# Patient Record
Sex: MALE | Race: WHITE | Employment: PART TIME | URBAN - METROPOLITAN AREA
[De-identification: names, ages, dates, MRNs, and addresses within clinical notes are randomized per-mention and may not be internally consistent; named-entity substitution may affect disease eponyms.]

---

## 2019-07-24 ENCOUNTER — TELEPHONE (OUTPATIENT)
Dept: FAMILY MEDICINE CLINIC | Age: 44
End: 2019-07-24

## 2019-07-24 DIAGNOSIS — Z00.00 ROUTINE GENERAL MEDICAL EXAMINATION AT A HEALTH CARE FACILITY: Primary | ICD-10-CM

## 2019-07-24 NOTE — PROGRESS NOTES
HISTORY OF PRESENT ILLNESS  Edmundo Richard is a 38 y.o. male. Mr. Bentley Moon has not been seen since 2015 [facility]. He continues to report stress associated with his busy work schedule as was also the case back in 2015. He now reports that he feels somewhat fatigued. He is concerned about residual urine in the bladder making it necessary for him to void again shortly after he has done so on some occasions. He denies nocturia, dribbling, or difficulty starting his urine stream.  He does report difficulty achieving erections over the past several weeks. Physical   The history is provided by the patient and medical records. Associated symptoms include headaches (not too much). Pertinent negatives include no chest pain, no abdominal pain and no shortness of breath.      Mr#: [id number]      Past Medical History:   Diagnosis Date    Groin pain     Testicular pain        Past Surgical History:   Procedure Laterality Date    HX CYST REMOVAL      neck       Family History   Problem Relation Age of Onset    Heart Disease Father     Hypertension Father     Lung Disease Father     No Known Problems Mother     Diabetes Father     High Cholesterol Father     No Known Problems Brother     No Known Problems Brother        No Known Allergies    Social History     Tobacco Use   Smoking Status Former Smoker    Packs/day: 0.50    Last attempt to quit: 2013    Years since quittin.8   Smokeless Tobacco Never Used       Social History     Substance and Sexual Activity   Alcohol Use Yes    Alcohol/week: 4.0 standard drinks    Types: 2 Glasses of wine, 2 Shots of liquor per week       Health Maintenance Review:  Tetanus immunization - declines, would like to research the indications and potential adverse effects of the Tdap immunization first  Influenza immunization - N/A        Patient Active Problem List   Diagnosis Code    Testicular pain N50.819         Current Outpatient Medications:    ibuprofen (ADVIL) 200 mg tablet, Take  by mouth., Disp: , Rfl:     acetaminophen (TYLENOL) 325 mg tablet, Take  by mouth every four (4) hours as needed for Pain., Disp: , Rfl:     acetaminophen (TYLENOL EXTRA STRENGTH) 500 mg tablet, Take  by mouth every six (6) hours as needed for Pain., Disp: , Rfl:     naproxen sodium (ALEVE) 220 mg tablet, Take 220 mg by mouth two (2) times daily (with meals). , Disp: , Rfl:         Review of Systems   Constitutional: Positive for malaise/fatigue (x 2 months, business stress). Negative for chills, fever and weight loss. HENT: Positive for hearing loss (sometimes has to ask for statements to be repeated). Eyes: Negative for blurred vision and double vision. Corrective lenses   Respiratory: Negative for cough, shortness of breath and wheezing. Cardiovascular: Negative for chest pain, palpitations and leg swelling. Gastrointestinal: Positive for heartburn (takes omeprazole). Negative for abdominal pain, blood in stool, constipation, diarrhea, melena, nausea and vomiting. Genitourinary: Negative for dysuria and urgency. Dribbling, need to urinate after  Problems achieving erection   Musculoskeletal: Negative for joint pain and myalgias. Skin: Negative for itching and rash. Neurological: Positive for dizziness (episodic, for 3-4 seconds, resolves with deep breath) and headaches (not too much). Negative for tingling, sensory change and focal weakness. Endo/Heme/Allergies: Positive for environmental allergies (seasonal). Psychiatric/Behavioral: Negative for depression. The patient is not nervous/anxious and does not have insomnia.          Stress but not depression     Visit Vitals  /60 (BP 1 Location: Left arm, BP Patient Position: Sitting)   Pulse 83   Temp 97.9 °F (36.6 °C) (Esophageal)   Resp 16   Ht 5' 11\" (1.803 m)   Wt 217 lb (98.4 kg)   SpO2 96%   BMI 30.27 kg/m²       Physical Exam   Constitutional: He is oriented to person, place, and time. He appears well-developed and well-nourished. HENT:   Head: Normocephalic. Right Ear: Tympanic membrane and ear canal normal.   Left Ear: Tympanic membrane and ear canal normal.   Mouth/Throat: Oropharynx is clear and moist.   Eyes: Pupils are equal, round, and reactive to light. Conjunctivae and EOM are normal.   Neck: Neck supple. Cardiovascular: Normal rate, regular rhythm, normal heart sounds and intact distal pulses. Pulmonary/Chest: Effort normal and breath sounds normal.   Abdominal: Soft. Bowel sounds are normal. There is no tenderness. Musculoskeletal: He exhibits no edema. Neurological: He is alert and oriented to person, place, and time. He has normal reflexes. Skin: Skin is warm and dry. Psychiatric: He has a normal mood and affect. His behavior is normal.   Nursing note and vitals reviewed. ASSESSMENT and PLAN    ICD-10-CM ICD-9-CM    1. Routine general medical examination at a health care facility Z00.00 V70.0    2. Malaise and fatigue R53.81 780.79     R53.83     3. Reaction to chronic stress F43.8 309.9    4. Lower urinary tract symptoms R39.9 788.99 PSA SCREENING (SCREENING)   5. Erectile dysfunction, unspecified erectile dysfunction type N52.9 607.84 TESTOSTERONE, FREE & TOTAL   6. Screen for STD (sexually transmitted disease) Z11.3 V74.5 CHLAMYDIA/NEISSERIA AMPLIFICATION      HIV 1/2 AG/AB, 4TH GENERATION,W RFLX CONFIRM      T PALLIDUM AB   7. Obesity (BMI 30.0-34. 9) E66.9 278.00    Return for early morning lab, before 8:30 AM-it is noted that some lab was drawn this morning at the patient's telephone request and he now reports that this was not a fasting specimen  Schedule follow-up appointment for about 10 days from now to review lab results  Avoid dietary starch and sugar and follow a program of regular aerobic exercise to work on weight control.

## 2019-07-24 NOTE — TELEPHONE ENCOUNTER
Orders entered, cannot be responsible for having ordered studies not covered by the patient's insurance since I do not know what his insurance status is

## 2019-07-24 NOTE — TELEPHONE ENCOUNTER
Pt will be coming in tomorrow to establish care. Was booked with Dr. Le Neil, but there was a scheduling error. Pt wanted to do labs but his appointment is @3pm. I told the pt that he could come in earlier for labs before appointment around 2pm. Pt said this isnt a problem, but he knows that the orders are not in. I told him that I would ask Dr. Jono Le if he can put them in ahead of time so pt could do them before appointment. Pt states that he will fast for this as well.

## 2019-07-25 ENCOUNTER — OFFICE VISIT (OUTPATIENT)
Dept: FAMILY MEDICINE CLINIC | Age: 44
End: 2019-07-25

## 2019-07-25 VITALS
WEIGHT: 217 LBS | BODY MASS INDEX: 30.38 KG/M2 | OXYGEN SATURATION: 96 % | TEMPERATURE: 97.9 F | DIASTOLIC BLOOD PRESSURE: 60 MMHG | SYSTOLIC BLOOD PRESSURE: 116 MMHG | RESPIRATION RATE: 16 BRPM | HEART RATE: 83 BPM | HEIGHT: 71 IN

## 2019-07-25 DIAGNOSIS — R39.9 LOWER URINARY TRACT SYMPTOMS: ICD-10-CM

## 2019-07-25 DIAGNOSIS — Z00.00 ROUTINE GENERAL MEDICAL EXAMINATION AT A HEALTH CARE FACILITY: Primary | ICD-10-CM

## 2019-07-25 DIAGNOSIS — Z11.3 SCREEN FOR STD (SEXUALLY TRANSMITTED DISEASE): ICD-10-CM

## 2019-07-25 DIAGNOSIS — R53.83 MALAISE AND FATIGUE: ICD-10-CM

## 2019-07-25 DIAGNOSIS — E66.9 OBESITY (BMI 30.0-34.9): ICD-10-CM

## 2019-07-25 DIAGNOSIS — R53.81 MALAISE AND FATIGUE: ICD-10-CM

## 2019-07-25 DIAGNOSIS — N52.9 ERECTILE DYSFUNCTION, UNSPECIFIED ERECTILE DYSFUNCTION TYPE: ICD-10-CM

## 2019-07-25 DIAGNOSIS — F43.89 REACTION TO CHRONIC STRESS: ICD-10-CM

## 2019-07-25 RX ORDER — OMEPRAZOLE 40 MG/1
40 CAPSULE, DELAYED RELEASE ORAL DAILY
COMMUNITY

## 2019-07-25 NOTE — TELEPHONE ENCOUNTER
Discussed with patient; labs have been ordered but because pt is new and has not been seen yet, we are not responsible is labs ordered at not covered by insurance; patient requested STD testing as well, advised pt to wait until his appt to discuss with new PCP.

## 2019-07-25 NOTE — PATIENT INSTRUCTIONS
Return for early morning lab, before 8:30 AM  Schedule follow-up appointment for about 10 days from now to review lab results  Avoid dietary starch and sugar and follow a program of regular aerobic exercise to work on weight control. ÒíÇÑÉ ÇáÝÍÕ ÇáØÈí ÇáÚÇã¡ ãä Óä 18 ÚÇãðÇ Åáì 50 ÚÇãðÇ: ÅÑÔÇÏÇÊ ÇáÑÚÇíÉ  [ Well Visit, Ages 25 to 48: Care Instructions ]  HOOTJLQ ÇáÑÚÇíÉ ÇáÎÇÕÉ Èß  ÞÏ ÊÓÇÚÏß RCUFMK ÇáÌÓÏíÉ Úáì BKTVJDWV Úáì ÊãÊÚß BHGNTF. Ýãä ÎáÇáåÇ¡ íÝÍÕ ÇáØÈíÈ ÕÍÊß ÈÔßá ÚÇã¡ æÑÈãÇ ÇÞÊÑÍ Úáíß ÓÈáÇð áÊÚÊäí ÈäÝÓß ÌíÏðÇ. æÑÈãÇ íæÕì ÃíÖðÇ ÈÅÌÑÇÁ ÝÍæÕÇÊ ØÈíÉ. Ýí DQKLJW¡ íãßäß ÇáãÓÇÚÏÉ Úáì ÇáæÞÇíÉ ãä ÇáãÑÖ ãä ÎáÇá ÊäÇæá ÇáØÚÇã ÇáÕÍí æããÇÑÓÉ ÇáÊãÇÑíä ÇáÑíÇÖíÉ ÈÇäÊÙÇã æÛíÑåÇ ãä ÇáÎØæÇÊ. ÊõÚÏ ÑÚÇíÉ ÇáãÊÇÈÚÉ ÌÒÁðÇ ÃÓÇÓíðÇ Ýí ÚáÇÌß æÓáÇãÊß. ÝÇÍÑÕ Úáì ÊÑÊíÈ ÌãíÚ ãæÇÚíÏ ÒíÇÑÉ ÇáØÈíÈ HBANQFOGQ ÈåÇ¡ æÇÊÕá ÈØÈíÈß ÅÐÇ ßäÊ ÊÚÇäí ãä ÃíÉ ãÔßáÇÊ. æãä ÇáÌíÏ ÃíÖðÇ Ãä ÊÚÑÝ äÊÇÆÌ UTBEWSJX æßÐáß JHREVAUT ÈÞÇÆãÉ ÇáÃÏæíÉ ÇáÊí SNJFDHTN. ßíÝ íãßäß ÇáÇÚÊäÇÁ ÈäÝÓß Ýí EKVFOV¿   · EBPXNW Åáì æÒä ÕÍí HDZZSOTHK Èå. ÝåÐÇ ÓíÄÏí Åáì ÇäÎÝÇÖ ÊÚÑÖß áãÎÇØÑ ÇáßËíÑ ãä WHIMHXX ãËá ÇáÓãäÉ æãÑÖ ÇáÓßÑí æÃãÑÇÖ ÇáÞáÈ æÇÑÊÝÇÚ ÖÛØ ÇáÏã.  · ãÇÑÓ ÇáÊãÇÑíä ÇáÑíÇÖíÉ áãÏÉ 30 ÏÞíÞÉ Úáì ÇáÃÞá Ýí ãÚÙã ÃíÇã ÇáÃÓÈæÚ. æíõÚÏ ÇáãÔí ÎíÇÑðÇ ÌíÏðÇ. æÞÏ ÊÑÛÈ ÃíÖðÇ Ýí ÇáÞíÇã ÈÃäÔØÉ ÃÎÑì¡ ãËá ÇáÌÑí Ãæ ZCXPUNT Ãæ ÑßæÈ QBGUOLUB Ãæ áÚÈ ÇáÊäÓ Ãæ ÇáÑíÇÖÇÊ ÇáÌãÇÚíÉ. äÇÞÔ Ãí ÊÛííÑÇÊ Ýí ÈÑäÇãÌ ÇáÊãÇÑíä ÇáÑíÇÖíÉ ãÚ ÇáØÈíÈ.  · áÇ ÊÏÎä æáÇ ÊÏÚ ÇáÂÎÑíä íÏÎäæä ãä Íæáß. ÅÐÇ ßäÊ ÈÍÇÌÉ Åáì ÇáãÓÇÚÏÉ ááÅÞáÇÚ Úä ÇáÊÏÎíä¡ ÝÊÍÏË ãÚ ÇáØÈíÈ Íæá ÇáÈÑÇãÌ æÇáÃÏæíÉ ÇáÎÇÕÉ ÈÇáÊæÞÝ Úä ÇáÊÏÎíä. íãßä Ãä íÒíÏ åÐÇ ãä ÝÑÕ ÇáÅÞáÇÚ Úä ÇáÊÏÎíä äåÇÆíðÇ.  · äÇÞÔ ãÚ ÇáØÈíÈ Íæá ãÇ ÅÐÇ ßäÊ ÚÑÖÉ ááÅÕÇÈÉ KNMKOQCQ ÇáÊí ÊäÊÞá Úä ØÑíÞ VUSCFBTX ÇáÌäÓíÉ. VVTYCAKQZ ÇáÒæÌíÉ ÇáÔÑÚíÉ æÓíáÉ ãËÇáíÉ áÊÌäÈ ÇáÅÕÇÈÉ ÈÚÏæì Êáß ÇáÃãÑÇÖ.  · ZFGLFVTU ááãÑÃÉ¡ íÊÚíä Ãä ÊÓÊÎÏã æÓÇÆá ãäÚ ÇáÍãá ÅÐÇ ßÇäÊ áÇ ÊÑíÏ ÅäÌÇÈ ÃØÝÇá Ýí åÐÇ ÇáæÞÊ. ÊÍÏË ãÚ ÇáØÈíÈ Íæá ÇáÎíÇÑÇÊ ÇáãÊÇÍÉ æãÇ ÞÏ íßæä ÇáÃÝÖá ÈÇáäÓÈÉ áß.    · ÇÍÑÕ ÏÇÆãðÇ Úáì ÇÓÊÎÏÇã ßÑíã æÇÞò ãä ÇáÔãÓ Úáì ÇáÌáÏ ÇáãßÔæÝ. ÊÃßÏ ãä Ãä ßÑíã ÇáæÞÇíÉ ãä ÇáÔãÓ íÞí ãä ÇáÃÔÚÉ ÝæÞ ÇáÈäÝÓÌíÉ (ÐÇÊ QDNAHFG ÇáãæÌíÉ ÇáØæíáÉ æÇáÞÕíÑÉ Úáì ÍÏ ÓæÇÁ) æÃä ÚÇãá ÇáæÞÇíÉ ãä ÇáÔãÓ Èå áÇ íÞá Úä 15. ÇÓÊÎÏãå íæãíðÇ ÍÊì Ýí ÍÇáÉ ÇáÛíæã. ÞÏ íæÕí ÈÚÖ ÇáÃØÈÇÁ MRIWMOYB ßÑíã æÞÇíÉ ãä ÇáÔãÓ ÈÏÑÌÉ ÚÇãá æÞÇíÉ ÃßÈÑ ãËá 30.  · Þã ÈÒíÇÑÉ ØÈíÈ ÇáÃÓäÇä ãÑÉ Ãæ ÇËäÊíä ÓäæíðÇ áÅÌÑÇÁ FWFGEGVF æÊäÙíÝ ÇáÃÓäÇä.  · ÇÑÊÏ ÍÒÇã ÇáÃãÇä Ýí ÇáÓíÇÑÉ.  · ÇÍÑÕ Úáì ÚÏã ÊäÇæá BAXGBJBBV. ÅÐ íÊÑÊÈ Úáì ÊäÇæáå ÃÖÑÇÑ ßÈíÑÉ Ýí åÐÇ ÇáÅØÇÑ. ÇÊÈÚ äÕíÍÉ ÇáØÈíÈ ÈÔÃä æÞÊ ÅÌÑÇÁ ÈÚÖ ODOMHPFD. íãßä áåÐå EQYUBOZI ÇáßÔÝ Úä CJCXNPD Ýí æÞÊ ãÈßÑ. áÌãíÚ ÇáÃÔÎÇÕ   · SILEZCXISP. íÌÈ ÇÎÊÈÇÑ äÓÈÉ ÇáßæáÓÊÑæá Ýí ÇáÏã áÏíß ÞÈá Óä ÇáÜ 20. ÓíÎÈÑß ÇáØÈíÈ ÈÚÏÏ ÇáãÑÇÊ ÇááÇÒãÉ ááÞíÇã ÈÐáß ÍÓÈ ÚãÑß Ãæ ÇáÊÇÑíÎ ÇáÚÇÆáí Ãæ ÛíÑåÇ ãä ÇáÃãæÑ ÇáÊí íãßä Ãä ÊÒíÏ ãä ÎØÑ ÇáÅÕÇÈÉ ÈÃãÑÇÖ ÇáÞáÈ.  · ÖÛØ ÇáÏã. íÌÈ ÞíÇÓ ÖÛØ ÇáÏã ÃËäÇÁ ÇáÒíÇÑÉ ÇáãÚÊÇÏÉ ááØÈíÈ. ÓíÎÈÑß ÇáØÈíÈ ÈÚÏÏ ÇáãÑÇÊ ÇááÇÒãÉ áÞíÇÓ ÖÛØ ÇáÏã ÍÓÈ ÚãÑß æäÊÇÆÌ ÖÛØ ÇáÏã VYNPSZQA ÇáÃÎÑì.  · ÇáÈÕÑ. ÊÍÏË ãÚ ÇáØÈíÈ Úä ÚÏÏ ÇáãÑÇÊ ÇáÊí íäÈÛí ÝíåÇ ÅÌÑÇÁ ÇÎÊÈÇÑ áÝÍÕ ÇáãíÇå ÇáÒÑÞÇÁ ÈÇáÚíä.  · ãÑÖ ÇáÓßÑí. ÇÓÃá ÇáØÈíÈ ãÇ ÅÐÇ ßÇä íÌÈ ÅÌÑÇÁ ÝÍæÕÇÊ áãÑÖ ÇáÓßÑí.  · ÓÑØÇä ÇáÞæáæä. íÌÈ ÅÌÑÇÁ ÝÍÕ áÓÑØÇä ÇáÞæáæä Ýí Óä ÇáÎãÓíä. ÞÏ ÊÎÖÚ PPYGG ãä TMBESBHZEN ÇáÚÏíÏÉ. ÅÐÇ ßäÊ ÃÕÛÑ ãä Óä 50¡ ÝÞÏ ÊÍÊÇÌ Åáì ÇÎÊÈÇÑ Ýí Óä ãÈßÑ Úä Ðáß ÅÐÇ ßäÊ ÊÚÇäí ãä Ãí ÚæÇãá ÎØÑ. ÊÔãá ÚæÇãá ÇáÎØÑ ãÇ ÅÐÇ ßäÊ ÊÚÇäí ÈÇáÝÚá ãä ÓáíáÉ ÓÑØÇäíÉ æÊãÊ RKJEJAX ãä FQUMRQD Ãæ ãÇ ÅÐÇ ßÇä ÃÍÏ æÇáÏíß Ãæ ÃÎæß Ãæ ÃÎÊß Ãæ æáÏß ãÕÇÈðÇ ÈÓÑØÇä QDVRAZJ. ÈÇáäÓÈÉ ááäÓÇÁ   · ÝÍÕ æÊÕæíÑ ÇáËÏí. ÊÍÏËí ãÚ ÇáØÈíÈ Íæá ãÊì íÌÈ ÅÌÑÇÁ ÝÍÕ ÇáËÏí ÇáÓÑíÑí æÊÕæíÑ ÇáËÏí. íÎÊáÝ ÇáÎÈÑÇÁ ÇáØÈíæä Íæá ãÇ ÅÐÇ ßÇä æßã ãÑÉ íäÈÛí Ãä ÊÌÑí ÇáäÓÇÁ ÊÍÊ Óä 50 åÐå WQAFSVHF. íãßä Ãä íÓÇÚÏß ÇáØÈíÈ Ýí ÊÍÏíÏ ãÇ åæ ãäÇÓÈ ÈÇáäÓÈÉ Åáíß.  · ÝÍÕ ÚäÞ Dewey Dari VVJVVS. íÌÈ ÈÏÁ ÇÎÊÈÇÑ ÚäÞ ÇáÑÍã Ýí Óä 21 ÚÇãðÇ. ÇÎÊÈÇÑ ÚäÞ ÇáÑÍã åæ ÃÝÖá ØÑíÞÉ áÇßÊÔÇÝ ÓÑØÇä ÚäÞ ÇáÑÍã. æíÚÏ ÇáÇÎÊÈÇÑ Ýí ßËíÑ ãä ÇáÃÍíÇä ÌÒÁðÇ ãä ÝÍÕ ÇáÍæÖ.  ÇÓÃáí Úä ÚÏÏ ÇáãÑÇÊ ÇáÊí íÊÚíä ÝíåÇ ÅÌÑÇÁ åÐÇ ÇáÇÎÊÈÇÑ.  · ÝÍæÕÇÊ ÇáÃãÑÇÖ NRJCFUDV ÌäÓíðÇ. ÇÓÃá ÚãÇ ÅÐÇ ßÇä íÌÈ Úáíß ÅÌÑÇÁ ÝÍæÕÇÊ ááÃãÑÇÖ RFFIPWCV ÌäÓíðÇ. ÞÏ Êßæäíä ãÚÑÖÉ ááÎØÑ ÅÐÇ ãÇÑÓÊö ÇáÌäÓ ãÚ ÃßËÑ ãä ÔÎÕ¡ ÎÇÕÉ Ýí ÍÇáÉ ÚÏã ÇÑÊÏÇÁ æÇÞ ÐßÑí. ÈÇáäÓÈÉ Avenida Nova 64 · ÝÍæÕÇÊ ÇáÃãÑÇÖ TYOZGPGY ÌäÓíðÇ. ÇÓÃá ÚãÇ ÅÐÇ ßÇä íÌÈ Úáíß ÅÌÑÇÁ ÝÍæÕÇÊ ááÃãÑÇÖ UWSNEGWY ÌäÓíðÇ. ÞÏ Êßæä ãÚÑÖðÇ ááÎØÑ ÅÐÇ ãÇÑÓÊ ÇáÌäÓ ãÚ ÃßËÑ ãä ÇãÑÃÉ¡ ÎÇÕÉ Ýí ÍÇáÉ ÚÏã ÇÑÊÏÇÁ æÇÞ ÐßÑí.  · ÝÍÕ ÓÑØÇä ÇáÎÕíÉ. ÇÓÃá ÇáØÈíÈ ÚãÇ ÅÐÇ ßÇä íÌÈ ÝÍÕ ÇáÎÕíÊíä ÈÔßá ãäÊÙã.  · Angie Rochester YKKXVRDPOD. ÊÍÏË ãÚ ÇáØÈíÈ Íæá ãÇ ÅÐÇ ßÇä íäÈÛí ÅÌÑÇÁ ÝÍÕ ÇáÏã (íÓãì ÇÎÊÈÇÑ ãÓÊÖÏ CIYWTTTJRM ÇáäæÚí \"PSA\") ááßÔÝ Úä ÓÑØÇä ZSSWSGHVJA. íÎÊáÝ ÇáÎÈÑÇÁ Íæá ãÇ ÅÐÇ ßÇä æãÊì íäÈÛí Úáì UOZRCV ÅÌÑÇÁ åÐÇ ÇáÝÍÕ. íÞÊÑÍ ÈÚÖ ÇáÎÈÑÇÁ Åáì Ãäå íÊÚíä ÇáÞíÇã ÈÐáß ÅÐÇ ßäÊ ÝæÞ Óä 45 æãä ÃÕá ÅÝÑíÞí ÃãÑíßí Ãæ áÏíß ÃÈ Ãæ ÃÎ ÃÕíÈ ÈÓÑØÇä JZJTGZRAJB ÚäÏãÇ ßÇä ÃÕÛÑ ãä 65. ãÊì íäÈÛí Úáíß JUDEKAJ áØáÈ ÇáãÓÇÚÏÉ¿  ÑÇÞÈ Úä ßËÈ ÇáÊÛííÑÇÊ ÇáÊí ÊÍÏË Ýí ÕÍÊß¡ æÇÍÑÕ Úáì ÇáÇÊÕÇá ÈÇáØÈíÈ ÅÐÇ ßäÊ ÊÚÇäí ãä Ãí ãÔÇßá Ãæ ÃÚÑÇÖ ÊËíÑ ÞáÞß. Ãíä íãßä ãÚÑÝÉ ÇáãÒíÏ¿  ÇäÊÞÇá Åáì http://[email or web address]  ÏÎæá P0 íãßäß ãÚÑÝÉ ÇáãÒíÏ ãä ÎáÇá ãÑÈÚ ÇáÈÍË \"ÒíÇÑÉ ÇáÝÍÕ ÇáØÈí ÇáÚÇã¡ ãä Óä 18 ÚÇãðÇ Åáì 48 ÚÇãðÇ: ÅÑÔÇÏÇÊ ÇáÑÚÇíÉ - [ Well Visit, Ages 25 to 48: Care Instructions ]. \"  © 2364-4726 Healthwise, Incorporated. ÊãÊ ÊåíÆÉ ÅÑÔÇÏÇÊ ÇáÚäÇíÉ ÈãæÌÈ ÊÑÎíÕ ãä ãÎÊÕ ÇáÑÚÇíÉ ÇáÕÍíÉ áÏíß. ÅÐÇ ßÇäÊ áÏíß ÃíÉ GLUHARRYC Úä ÍÇáÉ ØÈíÉ Ãæ Ãí ãä åÐå ZFESYMPYY¡ ÝÊæÌå ÏæãðÇ UQIKARQ Åáì ãÎÊÕ ÇáÑÚÇíÉ ÇáÕÍíÉ. ÊäÝí ãäÙãÉ Healthwise, Incorporated Cloria Mc ÖãÇä Ãæ Felicity Fairy HGGCXCF åÐå UBFXDVTRF. ÅÕÏÇÑ ÇáãÍÊæì: 12.1 ãÍÏøË ZPPVNWWE ãä: 6 ÑÈíÚ ÇáËÇäí 1440      ÈÏÁ ÎØÉ áÅäÞÇÕ ÇáæÒä: ÅÑÔÇÏÇÊ ÇáÑÚÇíÉ  [ Starting a Weight Loss Plan: Care Instructions ]  ÅÑÔÇÏÇÊ ÇáÑÚÇíÉ ÇáÎÇÕÉ Èß  ÅÐÇ ßäÊ ÊÝßÑ Ýí ÅäÞÇÕ ÇáæÒä¡ ÝÞÏ íßæä ãä ÇáÕÚÈ ãÚÑÝÉ äÞØÉ ÇáÈÏÇíÉ. ÓíÓÇÚÏß ØÈíÈß Ýí æÖÚ ÇáÎØÉ ÇáÊí ÊáÈí ÇÍÊíÇÌÇÊß Úáì Ãßãá æÌå. ÞÏ ÊÑÛÈ Ýí ÍÖæÑ ÏæÑÉ Úä ÇáÊÛÐíÉ Ãæ ÇáÊãÑíäÇÊ¡ Ãæ FTIHIVVM ÈãÌãæÚÉ ÏÚã áÅäÞÇÕ ÇáæÒä.  ÅÐÇ ßÇäÊ áÏíß ÃÓÆáÉ Íæá ßíÝíÉ Úãá ÊÛííÑÇÊ Ýí ÚÇÏÇÊ ÇáØÚÇã Ãæ ÇáÊãÇÑíä ÇáÑíÇÖíÉ ÇáÎÇÕÉ Èß¡ ÝÇØáÈ ãä ØÈíÈß ÒíÇÑÉ ãÊÎÕÕ ãÓÌá Ýí ÇáÊÛÐíÉ Ãæ ÇáÊãÇÑíä. íãßä Ãä íßæä ÅäÞÇÕ ÇáæÒä ÊÍÏíðÇ ßÈíÑðÇ. áßä áÇ íÊÚíä Úáíß Úãá ÊÛííÑÇÊ ÖÎãÉ ÏÝÚÉ æÇÍÏÉ. ÃÏÎá ÊÛííÑÇÊ ÈÓíØÉ æÇáÊÒã ÈåÇ. ÚäÏãÇ ÊÕÈÍ åÐå ÇáÊÛííÑÇÊ ÚÇÏÉ¡ ÃÖÝ ÇáÞáíá ãä ÇáÊÛííÑÇÊ ÇáÃÎÑì. ÅÐÇ ßäÊ áÇ ÊÚÊÞÏ Ãäß ãÓÊÚÏðÇ áÚãá ÊÛííÑÇÊ Ýí ÇáæÞÊ ÇáÍÇáí¡ ÝÍÇæá ÊÍÏíÏ ãæÚÏ Ýí XFPQTZYV. Þã ÈÊÍÏíÏ ãæÚÏ áÒíÇÑÉ ØÈíÈß áãäÇÞÔÉ ãÇ ÅÐÇ ßÇä ÇáæÞÊ ãäÇÓÈðÇ áÊÈÏÁ ÎØÉ Ãã áÇ. ÊõÚÏ ÑÚÇíÉ ÇáãÊÇÈÚÉ ÌÒÁðÇ ÃÓÇÓíðÇ Ýí ÚáÇÌß æÓáÇãÊß. ÝÚáíß ÇáÍÑÕ Úáì ÊÑÊíÈ ÌãíÚ ãæÇÚíÏ ÒíÇÑÉ ÇáØÈíÈ UOKOOUVFT ÈåÇ¡ FDZDRFNV ÈØÈíÈß ÚäÏ CRHWDJYR ãä Ãí ãÔßáÇÊ. æãä ÇáÌíÏ ÃíÖðÇ Ãä ÊÚÑÝ äÊÇÆÌ BLENDBOE æßÐáß FMGRVBAP ÈÞÇÆãÉ ÇáÃÏæíÉ ÇáÊí EEHXBQIU. ßíÝ íãßäß ÇáÇÚÊäÇÁ ÈäÝÓß Ýí HDCVIU¿   · ÖÚ ÃåÏÇÝðÇ æÇÞÚíÉ. ÇáßËíÑ ãä ÇáÃÔÎÇÕ íÊæÞÚæä ÝÞÏ æÒä ÃßËÑ ÈßËíÑ ãä ÇáãÑÌÍ. ÞÏ íßæä ÅäÞÇÕ 5% Åáì 10% ãä æÒäß ßÇÝíðÇ áÊÍÓíä ÕÍÊß.  · Þã ÈÅÔÑÇß ÇáÃÓÑÉ æÇáÃÕÏÞÇÁ áÊÞÏíã ÇáÏÚã. ÊÍÏË Åáíåã Úä ÓÈÈ ãÍÇæáÊß áÅäÞÇÕ æÒäß¡ æÇØáÈ ãäåã ÇáãÓÇÚÏÉ. íãßäåã ÇáãÓÇÚÏÉ Úä ØÑíÞ ÇáãÔÇÑßÉ Ýí ÇáÊãÇÑíä ÇáÑíÇÖíÉ æÊäÇæá ÇáæÌÈÇÊ ãÚß¡ ÍÊì ÅÐÇ ßÇäæÇ íÃßáæä ÃÔíÇÁ ãÎÊáÝÉ.  · ÊÚÑÝ Úáì ÃßËÑ ãÇ íäÇÓÈß. ÅÐÇ áß íßä áÏíß æÞÊ Ãæ áÇ ÊÍÈ ÇáØåí¡ ÝÞÏ íßæä SEIYSOHO ÇáÐí íÞÏã ÇáÔÑÇÆÍ DKKBJODPYC ÇáÈÏíáÉ ááæÌÈÇÊ åæ ÇáÃÝÖá áß. Çæ ÅÐÇ ßäÊ ÊÍÈ ÊÍÖíÑ ÇáØÚÇã¡ RADJPON Úáì ÎØÉ ÊÊÖãä ÞæÇÆã ææÕÝÇÊ íæãíÉ ÞÏ Êßæä HJMLWM LYNIVPA áß.  · ÇÓÃá ØÈíÈß Úä ãÎÊÕíä ÕÍííä ÂÎÑíä íãßäåã ãÓÇÚÏÊß Ýí ÊÍÞíÞ ÃåÏÇÝß áÅäÞÇÕ ÇáæÒä. o o íãßä Ãä íÓÇÚÏß ÃÎÕÇÆí ÇáÊÛÐíÉ Ýí Úãá ÊÛííÑÇÊ ÕÍíÉ Ýí äÙÇãß ÇáÛÐÇÆí. o o íãßä Ãä íÓÇÚÏß ÃÎÕÇÆí ÇáÊãÇÑíä ÇáÑíÇÖíÉ Ãæ ÇáãÏÑÈ ÇáÔÎÕí Ýí ÊØæíÑ ÈÑäÇãÌ ÊãÇÑíä Âãä æÝÚÇá. o o íãßä Ãä íÓÇÚÏß ÇáãÑÔÏ Çæ ÇáØÈíÈ ÇáäÝÓí Ýí ÇáÊÕÏí Åáì ãÔßáÇÊ ãËá KJSDPFVB¡ Ãæ TNJUNHS¡ Ãæ UYFLLPIL ÇáÃÓÑíÉ ÇáÊí íãßä Ãä ÊÕÚÈ ãä ÊÑßíÒß Úáì ÅäÞÇÕ ÇáæÒä.  · ÝßÑ Ýí IJBLRKDI ÈãÌãæÚÉ ÏÚã ááÃÔÎÇÕ ÇáÐíä íÍÇæáæä ÅäÞÇÕ æÒäåã. íãßä Ãä íÞÊÑÍ ØÈíÈß ãÌãæÚÇÊ Ýí ãäØÞÊß.   Ãíä íãßä ãÚÑÝÉ ÇáãÒíÏ¿  ÇäÊÞÇá Åáì [email or web address]  ÏÎæá U357 íãßäß ãÚÑÝÉ ÇáãÒíÏ ãä ÎáÇá ãÑÈÚ ÇáÈÍË \"ÈÏÁ ÎØÉ áÅäÞÇÕ ÇáæÒä: ÅÑÔÇÏÇÊ ÇáÑÚÇíÉ - [ Starting a Weight Loss Plan: Care Instructions ]. \"  © [id number] Healthwise, StickyADS.tv. ÊãÊ ÊåíÆÉ ÅÑÔÇÏÇÊ ÇáÚäÇíÉ ÈãæÌÈ ÊÑÎíÕ ãä ãÎÊÕ ÇáÑÚÇíÉ ÇáÕÍíÉ áÏíß. ÅÐÇ ßÇäÊ áÏíß ÃíÉ [id number] Úä ÍÇáÉ ØÈíÉ Ãæ Ãí ãä åÐå VMQOLNYSA¡ ÝÊæÌå ÏæãðÇ WLNVFAM Åáì ãÎÊÕ ÇáÑÚÇíÉ ÇáÕÍíÉ. ÊäÝí ãäÙãÉ [employer], [email or web address] Mimi Danika ÖãÇä Ãæ [place] NBZAKRY åÐå [id number]. ÅÕÏÇÑ ÇáãÍÊæì: 12.1 ãÍÏøË ÇÚÊÈÇÑ ãä: 21 ÑÌÈ 1440       Learning About Low-Carbohydrate Diets for Weight Loss  What is a low-carbohydrate diet? Low-carb diets avoid foods that are high in carbohydrate. These high-carb foods include pasta, bread, rice, cereal, fruits, and starchy vegetables. Instead, these diets usually have you eat foods that are high in fat and protein. Many people lose weight quickly on a low-carb diet. But the early weight loss is water. People on this diet often gain the weight back after they start eating carbs again. Not all diet plans are safe or work well. A lot of the evidence shows that low-carb diets aren't healthy. That's because these diets often don't include healthy foods like fruits and vegetables. Losing weight safely means balancing protein, fat, and carbs with every meal and snack. And low-carb diets don't always provide the vitamins, minerals, and fiber you need. If you have a serious medical condition, talk to your doctor before you try any diet. These conditions include kidney disease, heart disease, type 2 diabetes, high cholesterol, and high blood pressure. If you are pregnant, it may not be safe for your baby if you are on a low-carb diet. How can you lose weight safely? You might have heard that a diet plan helped another person lose weight. But that doesn't mean that it will work for you. It is very hard to stay on a diet that includes lots of big changes in your eating habits.  If you want to get to a healthy weight and stay there, making healthy lifestyle changes will often work better than dieting. These steps can help. · Make a plan for change. Work with your doctor to create a plan that is right for you. · See a dietitian. He or she can show you how to make healthy changes in your eating habits. · Manage stress. If you have a lot of stress in your life, it can be hard to focus on making healthy changes to your daily habits. · Track your food and activity. You are likely to do better at losing weight if you keep track of what you eat and what you do. Follow-up care is a key part of your treatment and safety. Be sure to make and go to all appointments, and call your doctor if you are having problems. It's also a good idea to know your test results and keep a list of the medicines you take. Where can you learn more? Go to http://deirdre-linda.info/. Enter A121 in the search box to learn more about \"Learning About Low-Carbohydrate Diets for Weight Loss. \"  Current as of: 2018  Content Version: 12.1  © 7327-2966 [employer]. Care instructions adapted under license by [employer] (which disclaims liability or warranty for this information). If you have questions about a medical condition or this instruction, always ask your healthcare professional. [address] any warranty or liability for your use of this information. Tdap (Tetanus, Diphtheria, Pertussis) Vaccine: What You Need to Know  Why get vaccinated? Tetanus, diphtheria, and pertussis are very serious diseases. Tdap vaccine can protect us from these diseases. And Tdap vaccine given to pregnant women can protect  babies against pertussis. Tetanus (lockjaw) is rare in the [facility] today. It causes painful muscle tightening and stiffness, usually all over the body.   · It can lead to tightening of muscles in the head and neck so you can't open your mouth, swallow, or sometimes even breathe. Tetanus kills about 1 out of 10 people who are infected even after receiving the best medical care. Diphtheria is also rare in the [place] today. It can cause a thick coating to form in the back of the throat. · It can lead to breathing problems, heart failure, paralysis, and death. Pertussis (whooping cough) causes severe coughing spells, which can cause difficulty breathing, vomiting, and disturbed sleep. · It can also lead to weight loss, incontinence, and rib fractures. Up to 2 in 100 adolescents and 5 in 100 adults with pertussis are hospitalized or have complications, which could include pneumonia or death. These diseases are caused by bacteria. Diphtheria and pertussis are spread from person to person through secretions from coughing or sneezing. Tetanus enters the body through cuts, scratches, or wounds. Before vaccines, as many as 200,000 cases of diphtheria, 200,000 cases of pertussis, and hundreds of cases of tetanus were reported in the [place] each year. Since vaccination began, reports of cases for tetanus and diphtheria have dropped by about 99% and for pertussis by about 80%. Tdap vaccine  The Tdap vaccine can protect adolescents and adults from tetanus, diphtheria, and pertussis. One dose of Tdap is routinely given at age 6 or 15. People who did not get Tdap at that age should get it as soon as possible. Tdap is especially important for health care professionals and anyone having close contact with a baby younger than 12 months. Pregnant women should get a dose of Tdap during every pregnancy, to protect the  from pertussis. Infants are most at risk for severe, life-threatening complications from pertussis. Another vaccine, called Td, protects against tetanus and diphtheria, but not pertussis. A Td booster should be given every 10 years. Tdap may be given as one of these boosters if you have never gotten Tdap before.  Tdap may also be given after a severe cut or burn to prevent tetanus infection. Your doctor or the person giving you the vaccine can give you more information. Tdap may safely be given at the same time as other vaccines. Some people should not get this vaccine  · A person who has ever had a life-threatening allergic reaction after a previous dose of any diphtheria-, tetanus-, or pertussis-containing vaccine, OR has a severe allergy to any part of this vaccine, should not get Tdap vaccine. Tell the person giving the vaccine about any severe allergies. · Anyone who had coma or long repeated seizures within 7 days after a childhood dose of DTP or DTaP, or a previous dose of Tdap, should not get Tdap, unless a cause other than the vaccine was found. They can still get Td. · Talk to your doctor if you:  ? Have seizures or another nervous system problem. ? Had severe pain or swelling after any vaccine containing diphtheria, tetanus, or pertussis. ? Ever had a condition called Guillain-Barré Syndrome (GBS). ? Aren't feeling well on the day the shot is scheduled. Risks  With any medicine, including vaccines, there is a chance of side effects. These are usually mild and go away on their own. Serious reactions are also possible but are rare. Most people who get Tdap vaccine do not have any problems with it.   Mild problems following Tdap  (Did not interfere with activities)  · Pain where the shot was given (about 3 in 4 adolescents or 2 in 3 adults)  · Redness or swelling where the shot was given (about 1 person in 5)  · Mild fever of at least 100.4°F (up to about 1 in 25 adolescents or 1 in 100 adults)  · Headache (about 3 or 4 people in 10)  · Tiredness (about 1 person in 3 or 4)  · Nausea, vomiting, diarrhea, stomachache (up to 1 in 4 adolescents or 1 in 10 adults)  · Chills, sore joints (about 1 person in 10)  · Body aches (about 1 person in 3 or 4)  · Rash, swollen glands (uncommon)  Moderate problems following Tdap  (Interfered with activities, but did not require medical attention)  · Pain where the shot was given (up to 1 in 5 or 6)  · Redness or swelling where the shot was given (up to about 1 in 16 adolescents or 1 in 12 adults)  · Fever over 102°F (about 1 in 100 adolescents or 1 in 250 adults)  · Headache (about 1 in 7 adolescents or 1 in 10 adults)  · Nausea, vomiting, diarrhea, stomachache (up to 1 to 3 people in 100)  · Swelling of the entire arm where the shot was given (up to about 1 in 500)  Severe problems following Tdap  (Unable to perform usual activities; required medical attention)  · Swelling, severe pain, bleeding and redness in the arm where the shot was given (rare)  Problems that could happen after any vaccine:  · People sometimes faint after a medical procedure, including vaccination. Sitting or lying down for about 15 minutes can help prevent fainting, and injuries caused by a fall. Tell your doctor if you feel dizzy or have vision changes or ringing in the ears. · Some people get severe pain in the shoulder and have difficulty moving the arm where a shot was given. This happens very rarely. · Any medication can cause a severe allergic reaction. Such reactions from a vaccine are very rare, estimated at fewer than 1 in a million doses, and would happen within a few minutes to a few hours after the vaccination. As with any medicine, there is a very remote chance of a vaccine causing a serious injury or death. The safety of vaccines is always being monitored. For more information, visit: www.cdc.gov/vaccinesafety. What if there is a serious problem? What should I look for? · Look for anything that concerns you, such as signs of a severe allergic reaction, very high fever, or unusual behavior. Signs of a severe allergic reaction can include hives, swelling of the face and throat, difficulty breathing, a fast heartbeat, dizziness, and weakness.  These would usually start a few minutes to a few hours after the vaccination. What should I do? · If you think it is a severe allergic reaction or other emergency that can't wait, call 9-1-1 or get the person to the nearest hospital. Otherwise, call your doctor. · Afterward, the reaction should be reported to the Vaccine Adverse Event Reporting System (VAERS). Your doctor might file this report, or you can do it yourself through the VAERS web site at www.vaers. [facility].gov, or by calling 2-332.790.7638. VAERS does not give medical advice. The National Vaccine Injury Compensation Program  The National Vaccine Injury Compensation Program (VICP) is a federal program that was created to compensate people who may have been injured by certain vaccines. Persons who believe they may have been injured by a vaccine can learn about the program and about filing a claim by calling 4-383.801.5569 or visiting the [employer] website at www.[facility].gov/vaccinecompensation. There is a time limit to file a claim for compensation. How can I learn more? · Ask your doctor. He or she can give you the vaccine package insert or suggest other sources of information. · Call your local or state health department. · Contact the Centers for Disease Control and Prevention (CDC):  ? Call 0-234.201.2051 (5-811-VPR-INFO) or  ? Visit CDC's website at www.cdc.gov/vaccines  Vaccine Information Statement (Interim)  Tdap Vaccine  (2/24/15)  [address]ina [id number]  Department of Health and Human Services  Centers for Disease Control and Prevention  Many Vaccine Information Statements are available in Mongolian and other languages. See www.immunize.org/vis. Muchas hojas de información sobre vacunas están disponibles en español y en otros idiomas. Visite www.immunize.org/vis. Care instructions adapted under license by [employer] (which disclaims liability or warranty for this information).  If you have questions about a medical condition or this instruction, always ask your healthcare professional. [employer], [facility] disclaims any warranty or liability for your use of this information. Erectile Dysfunction: Care Instructions  Your Care Instructions    A man has erectile dysfunction (ED) when he routinely can't get or keep an erection that allows satisfactory sex. He may not be able to have an erection at any time. Or he may not be able to have one that is firm enough or lasts long enough to complete intercourse. ED is not the same as having trouble getting an erection now and then. That's common. It happens to most men at some time. ED can be caused by problems with the blood vessels, nerves, or hormones. It can be caused by diabetes, heart disease, and injuries. Nerve disorders, such as multiple sclerosis or Parkinson's disease, can also cause it. ED can also be caused by medicines, alcohol, and tobacco. Or it may be caused by depression, stress, grief, or relationship problems. Follow-up care is a key part of your treatment and safety. Be sure to make and go to all appointments, and call your doctor if you are having problems. It's also a good idea to know your test results and keep a list of the medicines you take. How can you care for yourself at home?   Lifestyle    · Limit alcohol. Have no more than 2 drinks a day.     · Do not smoke. Smoking makes it harder for the blood vessels in the penis to relax and let blood flow in. If you need help quitting, talk to your doctor about stop-smoking programs and medicines. These can increase your chances of quitting for good.     · Do not use cocaine, heroin, or other illegal drugs.     · Try to reduce stress.     · Give yourself time to adjust to change. Changes in your job, family, relationships, home life, and other areas can cause stress. And stress can cause erection problems.    Work with your partner    · Don't assume that you know what your partner likes when it comes to sex. You may be wrong.  Talk about what each of you does and does not enjoy.     · Make time outside of the bedroom to talk about your sex life. If you avoid sex because you are afraid of having erection problems, your partner may worry that you are no longer interested.     · If you and your partner have trouble talking about sex, see a therapist who can help you talk about it. Reading books with your partner about sexual health may also help.     · Relax. Take time for more foreplay. Worrying about your erections may only make things worse. Medicines    · Tell your doctor about all the medicines that you take. ? Some medicines can cause erection problems. ? Some medicines can have dangerous interactions with medicines that are prescribed for ED, including over-the-counter medicines and herbal products.     · Be safe with medicines. Take your medicines exactly as prescribed. Call your doctor if you think you are having a problem with your medicine.     · Talk to your doctor about trying a medicine to help you keep an erection. This could be a medicine such as Viagra, Levitra, or Cialis. If you have a heart problem, ask your doctor if these are safe for you. Do not take these medicines if you take nitroglycerin or other nitrate medicine. When should you call for help? Call your doctor now or seek immediate medical care if:    · You took a medicine for erectile dysfunction and you have an erection that lasts longer than 3 hours.    Watch closely for changes in your health, and be sure to contact your doctor if you have any problems. Where can you learn more? Go to http://deirdre-linda.info/. Enter [id number] in the search box to learn more about \"Erectile Dysfunction: Care Instructions. \"  Current as of: September 26, 2018  Content Version: 12.1  © 8650-4993 [employer]. Care instructions adapted under license by [employer] (which disclaims liability or warranty for this information).  If you have questions about a medical condition or this instruction, always ask your healthcare professional. [place] [address] any warranty or liability for your use of this information.

## 2019-07-25 NOTE — PROGRESS NOTES
Jen Naik is a 38 y.o. male (: 1975) presenting to address:    Chief Complaint   Patient presents with   [facility] Establish Care    Headache     feeling lethargic, no energy       Vitals:    19 1441   BP: 116/60   Pulse: 83   Resp: 16   Temp: 97.9 °F (36.6 °C)   TempSrc: Esophageal   SpO2: 96%   Weight: 217 lb (98.4 kg)   Height: 5' 11\" (1.803 m)   PainSc:   0 - No pain       Hearing/Vision:   No exam data present    Learning Assessment:     Learning Assessment 2015   PRIMARY LEARNER Patient   HIGHEST LEVEL OF EDUCATION - PRIMARY LEARNER  4 YEARS OF COLLEGE   BARRIERS PRIMARY LEARNER NONE   CO-LEARNER CAREGIVER No   PRIMARY LANGUAGE OTHER (COMMENT)    NEED No   LEARNER PREFERENCE PRIMARY DEMONSTRATION   ANSWERED BY Patient   RELATIONSHIP SELF     Depression Screening:     3 most recent PHQ Screens 2019   Little interest or pleasure in doing things Not at all   Feeling down, depressed, irritable, or hopeless Not at all   Total Score PHQ 2 0     Fall Risk Assessment:   No flowsheet data found. Abuse Screening:   No flowsheet data found. Coordination of Care Questionaire:   1. Have you been to the ER, urgent care clinic since your last visit? Hospitalized since your last visit? NO    2. Have you seen or consulted any other health care providers outside of the [address] [place] [address] since your last visit? Include any pap smears or colon screening. NO    Advanced Directive:   1. Do you have an Advanced Directive? NO    2. Would you like information on Advanced Directives?  NO

## 2019-07-26 LAB
ALBUMIN SERPL-MCNC: 4.7 G/DL (ref 3.5–5.5)
ALBUMIN/GLOB SERPL: 1.8 {RATIO} (ref 1.2–2.2)
ALP SERPL-CCNC: 62 IU/L (ref 39–117)
ALT SERPL-CCNC: 27 IU/L (ref 0–44)
APPEARANCE UR: CLEAR
AST SERPL-CCNC: 25 IU/L (ref 0–40)
BACTERIA #/AREA URNS HPF: NORMAL /[HPF]
BASOPHILS # BLD AUTO: 0 X10E3/UL (ref 0–0.2)
BASOPHILS NFR BLD AUTO: 1 %
BILIRUB SERPL-MCNC: 0.3 MG/DL (ref 0–1.2)
BILIRUB UR QL STRIP: NEGATIVE
BUN SERPL-MCNC: 13 MG/DL (ref 6–24)
BUN/CREAT SERPL: 14 (ref 9–20)
CALCIUM SERPL-MCNC: 10.2 MG/DL (ref 8.7–10.2)
CASTS URNS QL MICRO: NORMAL /LPF
CHLORIDE SERPL-SCNC: 102 MMOL/L (ref 96–106)
CHOLEST SERPL-MCNC: 212 MG/DL (ref 100–199)
CO2 SERPL-SCNC: 22 MMOL/L (ref 20–29)
COLOR UR: YELLOW
CREAT SERPL-MCNC: 0.92 MG/DL (ref 0.76–1.27)
EOSINOPHIL # BLD AUTO: 0.2 X10E3/UL (ref 0–0.4)
EOSINOPHIL NFR BLD AUTO: 3 %
EPI CELLS #/AREA URNS HPF: NORMAL /HPF (ref 0–10)
ERYTHROCYTE [DISTWIDTH] IN BLOOD BY AUTOMATED COUNT: 12.9 % (ref 12.3–15.4)
EST. AVERAGE GLUCOSE BLD GHB EST-MCNC: 117 MG/DL
GLOBULIN SER CALC-MCNC: 2.6 G/DL (ref 1.5–4.5)
GLUCOSE SERPL-MCNC: 96 MG/DL (ref 65–99)
GLUCOSE UR QL: NEGATIVE
HBA1C MFR BLD: 5.7 % (ref 4.8–5.6)
HCT VFR BLD AUTO: 43 % (ref 37.5–51)
HDLC SERPL-MCNC: 41 MG/DL
HGB BLD-MCNC: 14.6 G/DL (ref 13–17.7)
HGB UR QL STRIP: NEGATIVE
IMM GRANULOCYTES # BLD AUTO: 0 X10E3/UL (ref 0–0.1)
IMM GRANULOCYTES NFR BLD AUTO: 0 %
INTERPRETATION, 910389: NORMAL
KETONES UR QL STRIP: NEGATIVE
LDLC SERPL CALC-MCNC: 118 MG/DL (ref 0–99)
LEUKOCYTE ESTERASE UR QL STRIP: NEGATIVE
LYMPHOCYTES # BLD AUTO: 2.4 X10E3/UL (ref 0.7–3.1)
LYMPHOCYTES NFR BLD AUTO: 32 %
MCH RBC QN AUTO: 30.2 PG (ref 26.6–33)
MCHC RBC AUTO-ENTMCNC: 34 G/DL (ref 31.5–35.7)
MCV RBC AUTO: 89 FL (ref 79–97)
MICRO URNS: ABNORMAL
MONOCYTES # BLD AUTO: 0.6 X10E3/UL (ref 0.1–0.9)
MONOCYTES NFR BLD AUTO: 8 %
MUCOUS THREADS URNS QL MICRO: PRESENT
NEUTROPHILS # BLD AUTO: 4.2 X10E3/UL (ref 1.4–7)
NEUTROPHILS NFR BLD AUTO: 56 %
NITRITE UR QL STRIP: NEGATIVE
PH UR STRIP: =>9 [PH] (ref 5–7.5)
PLATELET # BLD AUTO: 260 X10E3/UL (ref 150–450)
POTASSIUM SERPL-SCNC: 4.6 MMOL/L (ref 3.5–5.2)
PROT SERPL-MCNC: 7.3 G/DL (ref 6–8.5)
PROT UR QL STRIP: ABNORMAL
RBC # BLD AUTO: 4.84 X10E6/UL (ref 4.14–5.8)
RBC #/AREA URNS HPF: NORMAL /HPF (ref 0–2)
SODIUM SERPL-SCNC: 141 MMOL/L (ref 134–144)
SP GR UR: 1.02 (ref 1–1.03)
TRIGL SERPL-MCNC: 266 MG/DL (ref 0–149)
TSH SERPL DL<=0.005 MIU/L-ACNC: 1.69 UIU/ML (ref 0.45–4.5)
UROBILINOGEN UR STRIP-MCNC: 0.2 MG/DL (ref 0.2–1)
VLDLC SERPL CALC-MCNC: 53 MG/DL (ref 5–40)
WBC # BLD AUTO: 7.3 X10E3/UL (ref 3.4–10.8)
WBC #/AREA URNS HPF: NORMAL /HPF (ref 0–5)

## 2019-07-30 LAB
C TRACH RRNA SPEC QL NAA+PROBE: NEGATIVE
HIV 1+2 AB+HIV1 P24 AG SERPL QL IA: NON REACTIVE
N GONORRHOEA RRNA SPEC QL NAA+PROBE: NEGATIVE
PSA SERPL-MCNC: 1.1 NG/ML (ref 0–4)
T PALLIDUM AB SER QL IA: NEGATIVE
TESTOST FREE SERPL-MCNC: 10.9 PG/ML (ref 6.8–21.5)
TESTOST SERPL-MCNC: 288 NG/DL (ref 264–916)

## 2019-08-16 ENCOUNTER — OFFICE VISIT (OUTPATIENT)
Dept: FAMILY MEDICINE CLINIC | Age: 44
End: 2019-08-16

## 2019-08-16 VITALS
SYSTOLIC BLOOD PRESSURE: 120 MMHG | WEIGHT: 216 LBS | BODY MASS INDEX: 30.24 KG/M2 | HEIGHT: 71 IN | TEMPERATURE: 98.1 F | HEART RATE: 77 BPM | OXYGEN SATURATION: 96 % | DIASTOLIC BLOOD PRESSURE: 82 MMHG | RESPIRATION RATE: 16 BRPM

## 2019-08-16 DIAGNOSIS — R73.03 PREDIABETES: ICD-10-CM

## 2019-08-16 DIAGNOSIS — Z23 ENCOUNTER FOR IMMUNIZATION: ICD-10-CM

## 2019-08-16 DIAGNOSIS — N52.9 ERECTILE DYSFUNCTION, UNSPECIFIED ERECTILE DYSFUNCTION TYPE: Primary | ICD-10-CM

## 2019-08-16 DIAGNOSIS — R53.83 MALAISE AND FATIGUE: ICD-10-CM

## 2019-08-16 DIAGNOSIS — R53.81 MALAISE AND FATIGUE: ICD-10-CM

## 2019-08-16 RX ORDER — SILDENAFIL 50 MG/1
TABLET, FILM COATED ORAL
Qty: 5 TAB | Refills: 5 | Status: SHIPPED | OUTPATIENT
Start: 2019-08-16 | End: 2020-02-04

## 2019-08-16 NOTE — PROGRESS NOTES
Priti Guajardo is a 38 y.o. male (: 1975) presenting to address:    Chief Complaint   Patient presents with    Results     review lab results . request flu shot and one other that was recommended        Vitals:    19 1453   BP: 120/82   Pulse: 77   Resp: 16   Temp: 98.1 °F (36.7 °C)   TempSrc: Oral   SpO2: 96%   Weight: 216 lb (98 kg)   Height: 5' 11\" (1.803 m)   PainSc:   0 - No pain       Hearing/Vision:   No exam data present    Learning Assessment:     Learning Assessment 2015   PRIMARY LEARNER Patient   HIGHEST LEVEL OF EDUCATION - PRIMARY LEARNER  4 YEARS OF COLLEGE   BARRIERS PRIMARY LEARNER NONE   CO-LEARNER CAREGIVER No   PRIMARY LANGUAGE OTHER (COMMENT)    NEED No   LEARNER PREFERENCE PRIMARY DEMONSTRATION   ANSWERED BY Patient   RELATIONSHIP SELF     Depression Screening:     3 most recent PHQ Screens 2019   Little interest or pleasure in doing things Not at all   Feeling down, depressed, irritable, or hopeless Not at all   Total Score PHQ 2 0     Fall Risk Assessment:   No flowsheet data found. Abuse Screening:   No flowsheet data found. Coordination of Care Questionaire:   1. Have you been to the ER, urgent care clinic since your last visit? Hospitalized since your last visit? NO    2. Have you seen or consulted any other health care providers outside of the [address] [place] [address] since your last visit? Include any pap smears or colon screening. NO    Advanced Directive:   1. Do you have an Advanced Directive? NO    2. Would you like information on Advanced Directives? NO    Tdap , Flu shot Immunization/s administered 2019 by Meera Maguire LPN with guardian's consent. Patient tolerated procedure well. No reactions noted.

## 2019-08-16 NOTE — PATIENT INSTRUCTIONS
Sildenafil 50 mg Take 1 tablet 1 hour before intercourse, do not exceed 1 dose in 24 hours         Erectile Dysfunction: Care Instructions  Your Care Instructions    A man has erectile dysfunction (ED) when he routinely can't get or keep an erection that allows satisfactory sex. He may not be able to have an erection at any time. Or he may not be able to have one that is firm enough or lasts long enough to complete intercourse. ED is not the same as having trouble getting an erection now and then. That's common. It happens to most men at some time. ED can be caused by problems with the blood vessels, nerves, or hormones. It can be caused by diabetes, heart disease, and injuries. Nerve disorders, such as multiple sclerosis or Parkinson's disease, can also cause it. ED can also be caused by medicines, alcohol, and tobacco. Or it may be caused by depression, stress, grief, or relationship problems. Follow-up care is a key part of your treatment and safety. Be sure to make and go to all appointments, and call your doctor if you are having problems. It's also a good idea to know your test results and keep a list of the medicines you take. How can you care for yourself at home?   Lifestyle    · Limit alcohol. Have no more than 2 drinks a day.     · Do not smoke. Smoking makes it harder for the blood vessels in the penis to relax and let blood flow in. If you need help quitting, talk to your doctor about stop-smoking programs and medicines. These can increase your chances of quitting for good.     · Do not use cocaine, heroin, or other illegal drugs.     · Try to reduce stress.     · Give yourself time to adjust to change. Changes in your job, family, relationships, home life, and other areas can cause stress. And stress can cause erection problems.    Work with your partner    · Don't assume that you know what your partner likes when it comes to sex. You may be wrong.  Talk about what each of you does and does not enjoy.     · Make time outside of the bedroom to talk about your sex life. If you avoid sex because you are afraid of having erection problems, your partner may worry that you are no longer interested.     · If you and your partner have trouble talking about sex, see a therapist who can help you talk about it. Reading books with your partner about sexual health may also help.     · Relax. Take time for more foreplay. Worrying about your erections may only make things worse. Medicines    · Tell your doctor about all the medicines that you take. ? Some medicines can cause erection problems. ? Some medicines can have dangerous interactions with medicines that are prescribed for ED, including over-the-counter medicines and herbal products.     · Be safe with medicines. Take your medicines exactly as prescribed. Call your doctor if you think you are having a problem with your medicine.     · Talk to your doctor about trying a medicine to help you keep an erection. This could be a medicine such as Viagra, Levitra, or Cialis. If you have a heart problem, ask your doctor if these are safe for you. Do not take these medicines if you take nitroglycerin or other nitrate medicine. When should you call for help? Call your doctor now or seek immediate medical care if:    · You took a medicine for erectile dysfunction and you have an erection that lasts longer than 3 hours.    Watch closely for changes in your health, and be sure to contact your doctor if you have any problems. Where can you learn more? Go to http://deirdre-linda.info/. Enter [phone] in the search box to learn more about \"Erectile Dysfunction: Care Instructions. \"  Current as of: September 26, 2018  Content Version: 12.1  © 4641-1567 [employer]. Care instructions adapted under license by [employer] (which disclaims liability or warranty for this information).  If you have questions about a medical condition or this instruction, always ask your healthcare professional. [place] [address] any warranty or liability for your use of this information.

## 2019-08-16 NOTE — PROGRESS NOTES
HISTORY OF PRESENT ILLNESS  Edmundo Richard is a 38 y.o. male. Mr. Bentley Moon presented to [facility] on 7/25/2019 and reported problems with malaise and fatigue possibly associated with work stress versus other etiology and erectile dysfunction. He returns to review his lab results. Fatigue   The history is provided by the patient and medical records. This is a recurrent problem. Erectile Dysfunction   The history is provided by the patient and medical records. This is a chronic problem. Mr#: [id number]      Patient Active Problem List   Diagnosis Code    Testicular pain N50.819         Current Outpatient Medications:     omeprazole (PRILOSEC) 40 mg capsule, Take 40 mg by mouth daily. , Disp: , Rfl:      No Known Allergies      Review of Systems   Constitutional: Positive for fatigue and malaise/fatigue. Genitourinary:        Erectile dysfunction, patient feels that this is a direct result of his fatigue and stress associated with work     Visit Vitals  /82 (BP 1 Location: Left arm, BP Patient Position: Sitting)   Pulse 77   Temp 98.1 °F (36.7 °C) (Oral)   Resp 16   Ht 5' 11\" (1.803 m)   Wt 216 lb (98 kg)   SpO2 96%   BMI 30.13 kg/m²       Physical Exam   Constitutional: He is oriented to person, place, and time. He appears well-developed and well-nourished. HENT:   Head: Normocephalic. Eyes: EOM are normal.   Neck: Neck supple. Cardiovascular: Normal rate, regular rhythm and normal heart sounds. Pulmonary/Chest: Effort normal and breath sounds normal.   Musculoskeletal: He exhibits no edema. Neurological: He is alert and oriented to person, place, and time. Skin: Skin is warm and dry. Psychiatric: He has a normal mood and affect. His behavior is normal.   Nursing note and vitals reviewed. Results for Charles Sharif (MRN [id number]) as of 8/16/2019 12:51   Ref.  Range 7/24/2019 02:20   WBC Latest Ref Range: 3.4 - 10.8 x10E3/uL 7.3   RBC Latest Ref Range: 4.14 - 5.80 x10E6/uL 4.84   HGB Latest Ref Range: 13.0 - 17.7 g/dL 14.6   HCT Latest Ref Range: 37.5 - 51.0 % 43.0   MCV Latest Ref Range: 79 - 97 fL 89   MCH Latest Ref Range: 26.6 - 33.0 pg 30.2   MCHC Latest Ref Range: 31.5 - 35.7 g/dL 34.0   RDW Latest Ref Range: 12.3 - 15.4 % 12.9   PLATELET Latest Ref Range: 150 - 450 x10E3/uL 260   NEUTROPHILS Latest Ref Range: Not Estab. % 56   Lymphocytes Latest Ref Range: Not Estab. % 32   MONOCYTES Latest Ref Range: Not Estab. % 8   EOSINOPHILS Latest Ref Range: Not Estab. % 3   BASOPHILS Latest Ref Range: Not Estab. % 1   IMMATURE GRANULOCYTES Latest Ref Range: Not Estab. % 0   ABS. NEUTROPHILS Latest Ref Range: 1.4 - 7.0 x10E3/uL 4.2   ABS. IMM. GRANS. Latest Ref Range: 0.0 - 0.1 x10E3/uL 0.0   Abs Lymphocytes Latest Ref Range: 0.7 - 3.1 x10E3/uL 2.4   ABS. MONOCYTES Latest Ref Range: 0.1 - 0.9 x10E3/uL 0.6   ABS. EOSINOPHILS Latest Ref Range: 0.0 - 0.4 x10E3/uL 0.2   ABS. BASOPHILS Latest Ref Range: 0.0 - 0.2 x10E3/uL 0.0   Color Latest Ref Range: Yellow  Yellow   Appearance Latest Ref Range: Clear  Clear   Specific Gravity Latest Ref Range: 1.005 - 1.030  1.020   pH (UA) Latest Ref Range: 5.0 - 7.5  =>9.0 (A)   Protein Latest Ref Range: Negative/Trace  1+ (A)   Glucose Latest Ref Range: Negative  Negative   Ketone Latest Ref Range: Negative  Negative   Blood Latest Ref Range: Negative  Negative   Bilirubin Latest Ref Range: Negative  Negative   Urobilinogen Latest Ref Range: 0.2 - 1.0 mg/dL 0.2   Nitrites Latest Ref Range: Negative  Negative   Leukocyte Esterase Latest Ref Range: Negative  Negative   MICROSCOPIC EXAMINATION Unknown Rpt   Microscopic Examination Unknown See additional order   Epithelial cells Latest Ref Range: 0 - 10 /hpf 0-10   Mucus Latest Ref Range: Not Estab.   Present   WBC Latest Ref Range: 0 - 5 /hpf 0-5   RBC Latest Ref Range: 0 - 2 /hpf 0-2   Bacteria Latest Ref Range: None seen/Few  Few   Sodium Latest Ref Range: 134 - 144 mmol/L 141   Potassium Latest Ref Range: 3.5 - 5.2 mmol/L 4.6   Chloride Latest Ref Range: 96 - 106 mmol/L 102   CO2 Latest Ref Range: 20 - 29 mmol/L 22   Glucose Latest Ref Range: 65 - 99 mg/dL 96   BUN Latest Ref Range: 6 - 24 mg/dL 13   Creatinine Latest Ref Range: 0.76 - 1.27 mg/dL 0.92   BUN/Creatinine ratio Latest Ref Range: 9 - 20  14   Calcium Latest Ref Range: 8.7 - 10.2 mg/dL 10.2   GFR est non-AA Latest Ref Range: >59 mL/min/1.73 100   GFR est AA Latest Ref Range: >59 mL/min/1.73 116   Bilirubin, total Latest Ref Range: 0.0 - 1.2 mg/dL 0.3   Protein, total Latest Ref Range: 6.0 - 8.5 g/dL 7.3   Albumin Latest Ref Range: 3.5 - 5.5 g/dL 4.7   A-G Ratio Latest Ref Range: 1.2 - 2.2  1.8   ALT (SGPT) Latest Ref Range: 0 - 44 IU/L 27   AST Latest Ref Range: 0 - 40 IU/L 25   Alk. phosphatase Latest Ref Range: 39 - 117 IU/L 62   Triglyceride Latest Ref Range: 0 - 149 mg/dL 266 (H)   Cholesterol, total Latest Ref Range: 100 - 199 mg/dL 212 (H)   HDL Cholesterol Latest Ref Range: >39 mg/dL 41   LDL, calculated Latest Ref Range: 0 - 99 mg/dL 118 (H)   VLDL, calculated Latest Ref Range: 5 - 40 mg/dL 53 (H)   Hemoglobin A1c, (calculated) Latest Ref Range: 4.8 - 5.6 % 5.7 (H)   Estimated average glucose Latest Units: mg/dL 117   TSH Latest Ref Range: 0.450 - 4.500 uIU/mL 1.690   Results for Lindy Patino (MRN [id number]) as of 8/16/2019 12:51   Ref. Range 7/26/2019 08:41   Prostate Specific Ag Latest Ref Range: 0.0 - 4.0 ng/mL 1.1     STD screens for chlamydia, gonorrhea, HIV and syphilis were negative  ASSESSMENT and PLAN    ICD-10-CM ICD-9-CM    1. Erectile dysfunction, unspecified erectile dysfunction type N52.9 607.84 sildenafil citrate (VIAGRA) 50 mg tablet   2. Malaise and fatigue R53.81 780.79     R53.83     3. Prediabetes R73.03 790.29    4.  Encounter for immunization Z23 V03.89 INFLUENZA VIRUS VAC QUAD,SPLIT,PRESV FREE SYRINGE IM      TETANUS, DIPHTHERIA TOXOIDS AND ACELLULAR PERTUSSIS VACCINE (TDAP), IN INDIVIDS. >=7, IM     Lab results reviewed, findings of mildly elevated cholesterol levels and hemoglobin A1c consistent with prediabetes discussed with recommendation for avoidance of dietary starch and sugar and regular aerobic exercise with plans for follow-up and repeat lab in 6 months  Sildenafil 50 mg Take 1 tablet 1 hour before intercourse, do not exceed 1 dose in 24 hours

## 2020-10-20 NOTE — PROGRESS NOTES
HISTORY OF PRESENT ILLNESS  Nati Adler is a 54 y.o. male. He presents for health assessment and preventative care with a history of prediabetes and obesity  He reports that he has been feeling fatigued and has noted decreased motivation, decreased interest in pleasurable activities. Also continues to report loud snoring and not sleeping well, did not follow through on sleep medicine referral last year. Mr#: [id number]      Past Medical History:   Diagnosis Date    Groin pain     Testicular pain        Past Surgical History:   Procedure Laterality Date    HX CYST REMOVAL      neck       Family History   Problem Relation Age of Onset    No Known Problems Mother     Heart Disease Father     Hypertension Father     Lung Disease Father     Diabetes Father     High Cholesterol Father     No Known Problems Brother     No Known Problems Brother        No Known Allergies    Social History     Tobacco Use   Smoking Status Former Smoker    Packs/day: 0.50    Last attempt to quit: 2013    Years since quittin.1   Smokeless Tobacco Never Used   Tobacco Comment    Patient Vapes       Social History     Substance and Sexual Activity   Alcohol Use Yes    Alcohol/week: 4.0 standard drinks    Types: 2 Glasses of wine, 2 Shots of liquor per week           Patient Active Problem List   Diagnosis Code    Testicular pain N50.819         Current Outpatient Medications:     sildenafil citrate (VIAGRA) 50 mg tablet, TAKE 1 TABLET 1 HOUR BEFORE INTERCOURSE, DO NOT EXCEED 1 DOSE IN 24 HOURS, Disp: 15 Tab, Rfl: 12    omeprazole (PRILOSEC) 40 mg capsule, Take 40 mg by mouth daily. , Disp: , Rfl:       Review of Systems   Constitutional: Positive for malaise/fatigue. Negative for chills, fever and weight loss. HENT: Negative for congestion, hearing loss and sore throat. Eyes: Negative for blurred vision and double vision. Respiratory: Negative for cough, shortness of breath and wheezing.     Cardiovascular: Negative for chest pain, palpitations and leg swelling. Gastrointestinal: Positive for heartburn (occasional). Negative for abdominal pain, blood in stool, constipation, diarrhea, melena, nausea and vomiting. Genitourinary: Negative for dysuria and urgency. Musculoskeletal: Negative for joint pain and myalgias. Skin: Negative for itching and rash. Neurological: Positive for headaches (above the right eye, every day- feels this will be corrected by new glasses). Negative for dizziness, tingling, sensory change and focal weakness. Endo/Heme/Allergies: Negative for environmental allergies. Left tyyroid cyst, benign   Psychiatric/Behavioral: Positive for depression ( He reports anhedonia, fatigue, decreased motivation). Negative for suicidal ideas. The patient has insomnia (obstructive sleep apnea). The patient is not nervous/anxious. Visit Vitals  /62 (BP 1 Location: Left arm, BP Patient Position: Sitting)   Pulse 95   Temp 97.3 °F (36.3 °C) (Temporal)   Resp 15   Ht 5' 11\" (1.803 m)   Wt 227 lb 3.2 oz (103.1 kg)   SpO2 98%   BMI 31.69 kg/m²       Physical Exam  Vitals signs and nursing note reviewed. Constitutional:       General: He is not in acute distress. Appearance: Normal appearance. He is obese. He is not ill-appearing. Comments: Weight has increased by 11 pounds in the past 14 months   HENT:      Head: Normocephalic. Right Ear: Tympanic membrane, ear canal and external ear normal.      Left Ear: Tympanic membrane, ear canal and external ear normal.   Eyes:      Extraocular Movements: Extraocular movements intact. Conjunctiva/sclera: Conjunctivae normal.      Pupils: Pupils are equal, round, and reactive to light. Neck:      Musculoskeletal: Neck supple. Vascular: No carotid bruit. Cardiovascular:      Rate and Rhythm: Normal rate and regular rhythm. Heart sounds: Normal heart sounds.    Pulmonary:      Effort: Pulmonary effort is normal. Breath sounds: Normal breath sounds. Abdominal:      Palpations: Abdomen is soft. Tenderness: There is no abdominal tenderness. Musculoskeletal:         General: No deformity. Right lower leg: No edema. Left lower leg: No edema. Skin:     General: Skin is warm and dry. Neurological:      General: No focal deficit present. Mental Status: He is alert and oriented to person, place, and time. Psychiatric:         Mood and Affect: Mood normal.         Behavior: Behavior normal.         ASSESSMENT and PLAN    ICD-10-CM ICD-9-CM    1. Routine general medical examination at a health care facility  Z00.00 V70.0 CBC WITH AUTOMATED DIFF      HEMOGLOBIN A1C WITH EAG      LIPID PANEL      METABOLIC PANEL, COMPREHENSIVE      URINALYSIS W/ RFLX MICROSCOPIC      TSH 3RD GENERATION   2. Prediabetes  R73.03 790.29 HEMOGLOBIN A1C WITH EAG   3. Obesity (BMI 30.0-34. 9)  E66.9 278.00    4. Needs flu shot  Z23 V04.81 INFLUENZA VIRUS VAC QUAD,SPLIT,PRESV FREE SYRINGE IM   5. Reactive depression  F32.9 300.4 TSH 3RD GENERATION      buPROPion XL (WELLBUTRIN XL) 150 mg tablet   6. Sleep disturbance  G47.9 780.50 SLEEP MEDICINE REFERRAL   Health maintenance:  Influenza immunization- today    Lab today, further disposition pending lab results if indicated  Continue current medications  Avoid dietary starch and sugar and follow program of regular aerobic exercise  Referral for sleep medicine evaluation  Begin bupropion  mg daily in the morning  Return for video telemedicine follow-up in 3 weeks, report any problems in the interim  Return for annual physical exam and follow-up in 1 year, sooner with any problems      PLEASE NOTE:   This document has been produced using voice recognition software. Unrecognized errors in transcription may be present.

## 2020-10-21 ENCOUNTER — APPOINTMENT (OUTPATIENT)
Dept: FAMILY MEDICINE CLINIC | Age: 45
End: 2020-10-21

## 2020-10-21 ENCOUNTER — OFFICE VISIT (OUTPATIENT)
Dept: FAMILY MEDICINE CLINIC | Age: 45
End: 2020-10-21
Payer: COMMERCIAL

## 2020-10-21 VITALS
TEMPERATURE: 97.3 F | BODY MASS INDEX: 31.81 KG/M2 | HEART RATE: 95 BPM | DIASTOLIC BLOOD PRESSURE: 62 MMHG | OXYGEN SATURATION: 98 % | SYSTOLIC BLOOD PRESSURE: 110 MMHG | RESPIRATION RATE: 15 BRPM | WEIGHT: 227.2 LBS | HEIGHT: 71 IN

## 2020-10-21 DIAGNOSIS — Z00.00 ROUTINE GENERAL MEDICAL EXAMINATION AT A HEALTH CARE FACILITY: ICD-10-CM

## 2020-10-21 DIAGNOSIS — E66.9 OBESITY (BMI 30.0-34.9): ICD-10-CM

## 2020-10-21 DIAGNOSIS — Z23 NEEDS FLU SHOT: ICD-10-CM

## 2020-10-21 DIAGNOSIS — F32.9 REACTIVE DEPRESSION: ICD-10-CM

## 2020-10-21 DIAGNOSIS — R73.03 PREDIABETES: ICD-10-CM

## 2020-10-21 DIAGNOSIS — G47.9 SLEEP DISTURBANCE: ICD-10-CM

## 2020-10-21 DIAGNOSIS — Z00.00 ROUTINE GENERAL MEDICAL EXAMINATION AT A HEALTH CARE FACILITY: Primary | ICD-10-CM

## 2020-10-21 PROCEDURE — 99396 PREV VISIT EST AGE 40-64: CPT | Performed by: FAMILY MEDICINE

## 2020-10-21 PROCEDURE — 90686 IIV4 VACC NO PRSV 0.5 ML IM: CPT | Performed by: FAMILY MEDICINE

## 2020-10-21 PROCEDURE — 90471 IMMUNIZATION ADMIN: CPT | Performed by: FAMILY MEDICINE

## 2020-10-21 RX ORDER — BUPROPION HYDROCHLORIDE 150 MG/1
150 TABLET ORAL
Qty: 30 TAB | Refills: 0 | Status: SHIPPED | OUTPATIENT
Start: 2020-10-21 | End: 2020-11-12 | Stop reason: SINTOL

## 2020-10-21 NOTE — PROGRESS NOTES
Sukhwinder Landry is a 54 y.o. male (: 1975) presenting to address:    Chief Complaint   Patient presents with    Physical       Vitals:    10/21/20 0913   BP: 110/62   Pulse: 95   Resp: 15   Temp: 97.3 °F (36.3 °C)   TempSrc: Temporal   SpO2: 98%   Weight: 227 lb 3.2 oz (103.1 kg)   Height: 5' 11\" (1.803 m)   PainSc:   0 - No pain       Hearing/Vision:   No exam data present    Learning Assessment:     Learning Assessment 2015   PRIMARY LEARNER Patient   HIGHEST LEVEL OF EDUCATION - PRIMARY LEARNER  4 YEARS OF COLLEGE   BARRIERS PRIMARY LEARNER NONE   CO-LEARNER CAREGIVER No   PRIMARY LANGUAGE OTHER (COMMENT)    NEED No   LEARNER PREFERENCE PRIMARY DEMONSTRATION   ANSWERED BY Patient   RELATIONSHIP SELF     Depression Screening:     3 most recent PHQ Screens 10/21/2020   [facility] Not Done Patient Decline   Little interest or pleasure in doing things Not at all   Feeling down, depressed, irritable, or hopeless Not at all   Total Score PHQ 2 0     Fall Risk Assessment:   No flowsheet data found. Abuse Screening:   No flowsheet data found. Coordination of Care Questionaire:   1. Have you been to the ER, urgent care clinic since your last visit? Hospitalized since your last visit? NO    2. Have you seen or consulted any other health care providers outside of the [address] [place] [address] since your last visit? Include any pap smears or colon screening. YES, ENT for nodule on throat, Dr. Sandra Rosado    Advanced Directive:   1. Do you have an Advanced Directive? YES    2. Would you like information on Advanced Directives? NO      Immunization of the Flu vaccine was administered 10/21/2020 by Kristine Velasco LPN. Patient tolerated procedure well. No reactions noted.

## 2020-10-21 NOTE — PATIENT INSTRUCTIONS
Lab today, further disposition pending lab results if indicated Continue current medications Avoid dietary starch and sugar and follow program of regular aerobic exercise Referral for sleep medicine evaluation Begin bupropion  mg daily in the morning Return for video telemedicine follow-up in 3 weeks, report any problems in the interim Return for annual physical exam and follow-up in 1 year, sooner with any problems

## 2020-10-22 LAB
ALBUMIN SERPL-MCNC: 4.3 G/DL (ref 4–5)
ALBUMIN/GLOB SERPL: 1.5 {RATIO} (ref 1.2–2.2)
ALP SERPL-CCNC: 73 IU/L (ref 39–117)
ALT SERPL-CCNC: 28 IU/L (ref 0–44)
APPEARANCE UR: CLEAR
AST SERPL-CCNC: 28 IU/L (ref 0–40)
BASOPHILS # BLD AUTO: 0.1 X10E3/UL (ref 0–0.2)
BASOPHILS NFR BLD AUTO: 1 %
BILIRUB SERPL-MCNC: <0.2 MG/DL (ref 0–1.2)
BILIRUB UR QL STRIP: NEGATIVE
BUN SERPL-MCNC: 15 MG/DL (ref 6–24)
BUN/CREAT SERPL: 16 (ref 9–20)
CALCIUM SERPL-MCNC: 9 MG/DL (ref 8.7–10.2)
CHLORIDE SERPL-SCNC: 101 MMOL/L (ref 96–106)
CHOLEST SERPL-MCNC: 213 MG/DL (ref 100–199)
CO2 SERPL-SCNC: 24 MMOL/L (ref 20–29)
COLOR UR: YELLOW
CREAT SERPL-MCNC: 0.93 MG/DL (ref 0.76–1.27)
EOSINOPHIL # BLD AUTO: 0.2 X10E3/UL (ref 0–0.4)
EOSINOPHIL NFR BLD AUTO: 2 %
ERYTHROCYTE [DISTWIDTH] IN BLOOD BY AUTOMATED COUNT: 13.3 % (ref 11.6–15.4)
EST. AVERAGE GLUCOSE BLD GHB EST-MCNC: 120 MG/DL
GLOBULIN SER CALC-MCNC: 2.9 G/DL (ref 1.5–4.5)
GLUCOSE SERPL-MCNC: 84 MG/DL (ref 65–99)
GLUCOSE UR QL: NEGATIVE
HBA1C MFR BLD: 5.8 % (ref 4.8–5.6)
HCT VFR BLD AUTO: 43.5 % (ref 37.5–51)
HDLC SERPL-MCNC: 43 MG/DL
HGB BLD-MCNC: 14.6 G/DL (ref 13–17.7)
HGB UR QL STRIP: NEGATIVE
IMM GRANULOCYTES # BLD AUTO: 0 X10E3/UL (ref 0–0.1)
IMM GRANULOCYTES NFR BLD AUTO: 1 %
INTERPRETATION, 910389: NORMAL
KETONES UR QL STRIP: NEGATIVE
LDLC SERPL CALC-MCNC: 130 MG/DL (ref 0–99)
LEUKOCYTE ESTERASE UR QL STRIP: NEGATIVE
LYMPHOCYTES # BLD AUTO: 1.8 X10E3/UL (ref 0.7–3.1)
LYMPHOCYTES NFR BLD AUTO: 25 %
MCH RBC QN AUTO: 29.1 PG (ref 26.6–33)
MCHC RBC AUTO-ENTMCNC: 33.6 G/DL (ref 31.5–35.7)
MCV RBC AUTO: 87 FL (ref 79–97)
MICRO URNS: ABNORMAL
MONOCYTES # BLD AUTO: 0.4 X10E3/UL (ref 0.1–0.9)
MONOCYTES NFR BLD AUTO: 6 %
NEUTROPHILS # BLD AUTO: 4.5 X10E3/UL (ref 1.4–7)
NEUTROPHILS NFR BLD AUTO: 65 %
NITRITE UR QL STRIP: NEGATIVE
PH UR STRIP: 8.5 [PH] (ref 5–7.5)
PLATELET # BLD AUTO: 241 X10E3/UL (ref 150–450)
POTASSIUM SERPL-SCNC: 4.5 MMOL/L (ref 3.5–5.2)
PROT SERPL-MCNC: 7.2 G/DL (ref 6–8.5)
PROT UR QL STRIP: NEGATIVE
RBC # BLD AUTO: 5.02 X10E6/UL (ref 4.14–5.8)
SODIUM SERPL-SCNC: 138 MMOL/L (ref 134–144)
SP GR UR: 1.02 (ref 1–1.03)
TRIGL SERPL-MCNC: 222 MG/DL (ref 0–149)
TSH SERPL DL<=0.005 MIU/L-ACNC: 2.38 UIU/ML (ref 0.45–4.5)
UROBILINOGEN UR STRIP-MCNC: 0.2 MG/DL (ref 0.2–1)
VLDLC SERPL CALC-MCNC: 40 MG/DL (ref 5–40)
WBC # BLD AUTO: 7 X10E3/UL (ref 3.4–10.8)

## 2020-10-22 NOTE — PROGRESS NOTES
He is advised that lab results show no significant abnormalities except that the hemoglobin A1c remains consistent with prediabetes and cholesterol levels are mildly elevated. Both problems should be addressed with avoidance of dietary starch and sugar and a program of regular aerobic exercise.

## 2020-10-29 ENCOUNTER — TELEPHONE (OUTPATIENT)
Dept: FAMILY MEDICINE CLINIC | Age: 45
End: 2020-10-29

## 2020-10-29 NOTE — TELEPHONE ENCOUNTER
Spoke w/pt and went over his last lab results again, pt asked for clarification d/t feeling dizzy and nauseous today; pt states he is trying to drink more water daily and change his eating habits; pt will call back tomorrow to give us an update on how he is feeling.

## 2020-10-29 NOTE — TELEPHONE ENCOUNTER
Patient is feeling dizzy and like he wants to throw up. He is concerned. He mentioned talking to you about his labs and that something was not normal. He states his english is not good and may not have understood everything and would like to go over it again. 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

## 2020-11-12 ENCOUNTER — VIRTUAL VISIT (OUTPATIENT)
Dept: FAMILY MEDICINE CLINIC | Age: 45
End: 2020-11-12
Payer: COMMERCIAL

## 2020-11-12 DIAGNOSIS — R73.03 PREDIABETES: ICD-10-CM

## 2020-11-12 DIAGNOSIS — R45.89 SYMPTOMS OF DEPRESSION: Primary | ICD-10-CM

## 2020-11-12 PROCEDURE — 99213 OFFICE O/P EST LOW 20 MIN: CPT | Performed by: FAMILY MEDICINE

## 2020-11-12 NOTE — PATIENT INSTRUCTIONS
Wholeheartedly recommend continuing these lifestyle changes. Advised that it is not necessary to do frequent home glucometer checks. Follow-up for failure to continue to improve and with any other problems.

## 2020-11-12 NOTE — PROGRESS NOTES
Alina Sanders is a 54 y.o. male who was seen by synchronous (real-time) audio-video technology using doxy. me on 11/12/2020. Mr#: [id number]    Consent:  He and/or his healthcare decision maker is aware that this patient-initiated Telehealth encounter is a billable service, with coverage as determined by his insurance carrier. He is aware that he may receive a bill and has provided verbal consent to proceed: YES    I was in the office while conducting this encounter. 712  HPI/Subjective:     He presents for follow-up after having started on bupropion  on 10/21/2020 when he reported some anhedonia, decreased motivation and energy and difficulty focusing. Today he reports that he stopped the medication after 5 days because it seemed to cause dizziness. He was subsequently advised that his routine physical exam labs showed a mildly elevated hemoglobin A1c consistent with prediabetes. He notes that this motivated him to dramatically change his lifestyle with regard to diet and exercise. He has lost 10 pounds and now his previous symptoms of possible depression have resolved. Patient Active Problem List   Diagnosis Code    Testicular pain N50.819            No Known Allergies      Current Outpatient Medications:     sildenafil citrate (VIAGRA) 50 mg tablet, TAKE 1 TABLET 1 HOUR BEFORE INTERCOURSE, DO NOT EXCEED 1 DOSE IN 24 HOURS, Disp: 15 Tab, Rfl: 12    omeprazole (PRILOSEC) 40 mg capsule, Take 40 mg by mouth daily. , Disp: , Rfl:       Review of Systems   Respiratory: Negative for shortness of breath. Cardiovascular: Negative for chest pain and palpitations. Gastrointestinal: Negative for abdominal pain. Psychiatric/Behavioral: Negative for depression and suicidal ideas.          PHYSICAL EXAMINATION:    Constitutional: [x] Appears well-developed and well-nourished [x] No apparent distress        Mental status: [x] Alert and awake  [x] Oriented t [x] Able to follow commands      Eyes: EOM    [x]  Normal        HENT: [x] Normocephalic,      Pulmonary/Chest: [x] Respiratory effort normal   [x] No visualized signs of difficulty breathing or respiratory distress           Musculoskeletal:   [x] No obvious deformities noted with regard to areas viewed           Neurological:        [x] No apparent neurologic deficits noted in areas viewed                 Skin:        [x] No apparent dermatologic abnormalities noted in areas viewed               Psychiatric:       [x] Normal Affect      Other pertinent observable physical exam findings:-None noted          Assessment & Plan:   Diagnoses and all orders for this visit:    1. Symptoms of depression    2. Prediabetes    Wholeheartedly recommend continuing these lifestyle changes. Advised that it is not necessary to do frequent home glucometer checks. Follow-up for failure to continue to improve and with any other problems. I advised him to contact the office if his condition worsens, changes, fails to improve as anticipated and with any new problems. He expressed understanding with the diagnosis(es) and plan. [facility], the patient is at home and the provider is at [facility] and [address] to the emergency declaration under the [facility], [facility] authority and the [employer] and [employer] Act, this Virtual  Visit was conducted, with patient's consent, to reduce the patient's risk of exposure to COVID-19 and provide continuity of care for an established patient. Services were provided through a video synchronous discussion virtually to substitute for in-person clinic visit. Zina Gurrola MD         PLEASE NOTE:   This document has been produced using voice recognition software. Unrecognized errors in transcription may be present.

## 2021-02-04 ENCOUNTER — VIRTUAL VISIT (OUTPATIENT)
Dept: FAMILY MEDICINE CLINIC | Age: 46
End: 2021-02-04
Payer: COMMERCIAL

## 2021-02-04 DIAGNOSIS — R42 DIZZINESS: ICD-10-CM

## 2021-02-04 DIAGNOSIS — U07.1 COVID-19 VIRUS INFECTION: Primary | ICD-10-CM

## 2021-02-04 DIAGNOSIS — N52.9 ERECTILE DYSFUNCTION, UNSPECIFIED ERECTILE DYSFUNCTION TYPE: ICD-10-CM

## 2021-02-04 PROCEDURE — 99213 OFFICE O/P EST LOW 20 MIN: CPT | Performed by: FAMILY MEDICINE

## 2021-02-04 RX ORDER — TADALAFIL 20 MG/1
TABLET ORAL
Qty: 30 TAB | Refills: 3 | Status: SHIPPED | OUTPATIENT
Start: 2021-02-04 | End: 2021-02-17 | Stop reason: SDUPTHER

## 2021-02-04 RX ORDER — SILDENAFIL 50 MG/1
TABLET, FILM COATED ORAL
Qty: 15 TAB | Refills: 12 | Status: CANCELLED | OUTPATIENT
Start: 2021-02-04

## 2021-02-04 NOTE — PATIENT INSTRUCTIONS
Recommend observation of vertigo/nausea symptoms anticipating spontaneous resolution, report back if symptoms should worsen or fail to continue to improve Advised that COVID-19 immunization would still be indicated for him 90 days after his diagnosis with the illness. Prescription for tadalafil sent to his pharmacy to replace sildenafil

## 2021-02-04 NOTE — PROGRESS NOTES
Alfredo Barlow is a 54 y.o. male who was seen by synchronous (real-time) audio-video technology using doxy. me on 2/4/2021. Mr#: [id number]    Consent:  He and/or his healthcare decision maker is aware that this patient-initiated Telehealth encounter is a billable service, with coverage as determined by his insurance carrier. He is aware that he may receive a bill and has provided verbal consent to proceed: YES    I was in the office while conducting this encounter. 712  HPI/Subjective:     He reports having tested positive for COVID-19 on 1/23/2021 and that he is feeling much better except that he has persistent episodes of nausea and and dizziness often upon awakening and which also seem to be precipitated by eating. He has noted today that the symptoms seem to be subsiding somewhat. He also reports that his new insurance carrier has denied coverage of Viagra and requests a possible alternate prescription. Patient Active Problem List   Diagnosis Code    Testicular pain N50.819            No Known Allergies      Current Outpatient Medications:     sildenafil citrate (VIAGRA) 50 mg tablet, TAKE 1 TABLET 1 HOUR BEFORE INTERCOURSE, DO NOT EXCEED 1 DOSE IN 24 HOURS, Disp: 15 Tab, Rfl: 12    omeprazole (PRILOSEC) 40 mg capsule, Take 40 mg by mouth daily. , Disp: , Rfl:       Review of Systems   Constitutional: Negative for fever and malaise/fatigue. HENT: Negative for sore throat. Sense of taste and smell have returned   Respiratory: Negative for cough, shortness of breath and wheezing. Gastrointestinal: Positive for nausea. Musculoskeletal: Negative for myalgias. Neurological: Positive for dizziness ( Dizziness with vertigo type symptoms made worse by eating, see HPI).          PHYSICAL EXAMINATION:    Constitutional: [x] Appears well-developed and well-nourished [x] No apparent distress        Mental status: [x] Alert and awake  [x] Oriented t [x] Able to follow commands      Eyes: EOM    [x]  Normal        HENT: [x] Normocephalic,      Pulmonary/Chest: [x] Respiratory effort normal   [x] No visualized signs of difficulty breathing or respiratory distress           Musculoskeletal:   [x] No obvious deformities noted with regard to areas viewed           Neurological:        [x] No apparent neurologic deficits noted in areas viewed                 Skin:        [x] No apparent dermatologic abnormalities noted in areas viewed               Psychiatric:       [x] Normal Affect      Other pertinent observable physical exam findings:-None noted          Assessment & Plan:   Diagnoses and all orders for this visit:    1. COVID-19 virus infection    2. Dizziness    3. Erectile dysfunction, unspecified erectile dysfunction type  -     tadalafiL (CIALIS) 20 mg tablet; Take 1 tablet 1 hour before intercourse, do not exceed 1 dose in 24 hours    Recommend observation of vertigo/nausea symptoms anticipating spontaneous resolution, report back if symptoms should worsen or fail to continue to improve  Advised that COVID-19 immunization would still be indicated for him 90 days after his diagnosis with the illness. Prescription for tadalafil sent to his pharmacy to replace sildenafil      I advised him to contact the office if his condition worsens, changes, fails to improve as anticipated and with any new problems. He expressed understanding with the diagnosis(es) and plan. [facility], the patient is at home and the provider is at [address] [facility] and [facility], [address] [place] [address] to the emergency declaration under the [address], [facility] authority and the [employer] and [employer] Act, this Virtual  Visit was conducted, with patient's consent, to reduce the patient's risk of exposure to COVID-19 and provide continuity of care for an established patient.      Services were provided through a video synchronous discussion virtually to substitute for in-person clinic visit. Christel Burgess MD         PLEASE NOTE:   This document has been produced using voice recognition software. Unrecognized errors in transcription may be present.

## 2021-02-04 NOTE — PROGRESS NOTES
Ermias Montero is a 54 y.o. male (: 1975) presenting to address:    Chief Complaint   Patient presents with    Positive For Covid-19     tested positive 2021 and has some questions       There were no vitals filed for this visit. Hearing/Vision:   No exam data present    Learning Assessment:     Learning Assessment 10/21/2020   PRIMARY LEARNER Patient   HIGHEST LEVEL OF EDUCATION - PRIMARY LEARNER  > 4 YEARS OF COLLEGE   BARRIERS PRIMARY LEARNER NONE   CO-LEARNER CAREGIVER No   PRIMARY LANGUAGE ENGLISH    NEED No   LEARNER PREFERENCE PRIMARY DEMONSTRATION   ANSWERED BY patient   RELATIONSHIP SELF     Depression Screening:     3 most recent PHQ Screens 10/21/2020   [facility] 36 Not Done Patient Decline   Little interest or pleasure in doing things Not at all   Feeling down, depressed, irritable, or hopeless Not at all   Total Score PHQ 2 0     Fall Risk Assessment:     Fall Risk Assessment, last 12 mths 10/21/2020   Able to walk? Yes   Fall in past 12 months? No     Abuse Screening:     Abuse Screening Questionnaire 10/21/2020   Do you ever feel afraid of your partner? N   Are you in a relationship with someone who physically or mentally threatens you? N   Is it safe for you to go home? Y     Coordination of Care Questionaire:   1. Have you been to the ER, urgent care clinic since your last visit? Hospitalized since your last visit? YES, [facility] for [facility]    2. Have you seen or consulted any other health care providers outside of the [address] [place] [address] since your last visit? Include any pap smears or colon screening. NO    Advanced Directive:   1. Do you have an Advanced Directive? NO    2. Would you like information on Advanced Directives?  NO

## 2021-02-17 ENCOUNTER — TELEPHONE (OUTPATIENT)
Dept: FAMILY MEDICINE CLINIC | Age: 46
End: 2021-02-17

## 2021-02-17 DIAGNOSIS — N52.9 ERECTILE DYSFUNCTION, UNSPECIFIED ERECTILE DYSFUNCTION TYPE: Primary | ICD-10-CM

## 2021-02-17 RX ORDER — TADALAFIL 20 MG/1
TABLET ORAL
Qty: 6 TAB | Refills: 12 | Status: SHIPPED | OUTPATIENT
Start: 2021-02-17 | End: 2021-10-22

## 2021-02-17 NOTE — TELEPHONE ENCOUNTER
Pt called and said he spoke with his insurance and they will approve tadalafil 20 mg at 6 tablets for 22 days. Please advise.

## 2021-03-02 NOTE — TELEPHONE ENCOUNTER
Spoke w/pharmacy tech and they ran the med through and it was not approved; spoke w/pt and he also was told by insurance the medication is not approved and pt would have to pay out of pocket (pt was given the wrong information from first person he spoke at [employer]).

## 2021-08-30 ENCOUNTER — TELEPHONE (OUTPATIENT)
Dept: FAMILY MEDICINE CLINIC | Age: 46
End: 2021-08-30

## 2021-08-30 NOTE — TELEPHONE ENCOUNTER
Patient is scheduled for the following:   Future Appointments   Date Time Provider Aviva Valdez   10/20/2021 10:00 AM LAB_[facility] MIRANDA BS AMB   10/22/2021 10:00 AM MD RUTH Cheatham BS AMB

## 2021-08-30 NOTE — TELEPHONE ENCOUNTER
Pt is scheduled for CPE. Pt would like to have labs drawn ptior to. Could you place labs for pt.     Future Appointments   Date Time Provider Aviva Valdez   9/3/2021 10:00 AM LAB_[facility] BSMA BS AMB   10/22/2021 10:00 AM Laurin Ormond, MD [facility] BS AMB

## 2021-10-19 ENCOUNTER — TELEPHONE (OUTPATIENT)
Dept: FAMILY MEDICINE CLINIC | Age: 46
End: 2021-10-19

## 2021-10-19 DIAGNOSIS — R73.03 PREDIABETES: ICD-10-CM

## 2021-10-19 DIAGNOSIS — Z00.00 ROUTINE HEALTH MAINTENANCE: Primary | ICD-10-CM

## 2021-10-19 NOTE — TELEPHONE ENCOUNTER
Called patient an apologized for any inconvenience also scheduled him labs for Thursday since the lab is closed tomorrow.      Future Appointments   Date Time Provider Aviva Valdez   10/21/2021 10:00 AM LAB_[facility] BS AMB   10/22/2021 10:00 AM Femi Abdul MD [facility] BS AMB

## 2021-10-19 NOTE — TELEPHONE ENCOUNTER
A patient should never be turned away because of lack of lab orders.   The lab folks should know to draw enough tubes to cover orders or have someone let me know that the patient is here waiting for lab orders

## 2021-10-19 NOTE — TELEPHONE ENCOUNTER
Pt came in for a lab appt today but there was no orders in the system. He is requesting a call once the orders are placed in the system.      Future Appointments   Date Time Provider Aviva Valdez   10/22/2021 10:00 AM Juanis Short MD [facility] BS AMB

## 2021-10-21 ENCOUNTER — APPOINTMENT (OUTPATIENT)
Dept: FAMILY MEDICINE CLINIC | Age: 46
End: 2021-10-21

## 2021-10-21 DIAGNOSIS — R73.03 PREDIABETES: ICD-10-CM

## 2021-10-21 DIAGNOSIS — Z00.00 ROUTINE HEALTH MAINTENANCE: ICD-10-CM

## 2021-10-22 ENCOUNTER — OFFICE VISIT (OUTPATIENT)
Dept: FAMILY MEDICINE CLINIC | Age: 46
End: 2021-10-22
Payer: COMMERCIAL

## 2021-10-22 VITALS
BODY MASS INDEX: 29.96 KG/M2 | RESPIRATION RATE: 16 BRPM | SYSTOLIC BLOOD PRESSURE: 110 MMHG | OXYGEN SATURATION: 97 % | WEIGHT: 214 LBS | DIASTOLIC BLOOD PRESSURE: 70 MMHG | TEMPERATURE: 98 F | HEIGHT: 71 IN | HEART RATE: 87 BPM

## 2021-10-22 DIAGNOSIS — E78.2 MIXED HYPERLIPIDEMIA: ICD-10-CM

## 2021-10-22 DIAGNOSIS — E66.9 OBESITY (BMI 30.0-34.9): ICD-10-CM

## 2021-10-22 DIAGNOSIS — Z12.11 COLON CANCER SCREENING: ICD-10-CM

## 2021-10-22 DIAGNOSIS — Z00.00 ROUTINE GENERAL MEDICAL EXAMINATION AT A HEALTH CARE FACILITY: Primary | ICD-10-CM

## 2021-10-22 DIAGNOSIS — R73.03 PREDIABETES: ICD-10-CM

## 2021-10-22 LAB
A-G RATIO,AGRAT: 1.7 RATIO (ref 1.1–2.6)
ABSOLUTE LYMPHOCYTE COUNT, 10803: 2 K/UL (ref 1–4.8)
ALBUMIN SERPL-MCNC: 4.6 G/DL (ref 3.5–5)
ALP SERPL-CCNC: 71 U/L (ref 25–115)
ALT SERPL-CCNC: 40 U/L (ref 5–40)
ANION GAP SERPL CALC-SCNC: 12 MMOL/L (ref 3–15)
AST SERPL W P-5'-P-CCNC: 23 U/L (ref 10–37)
AVG GLU, 10930: 118 MG/DL (ref 91–123)
BASOPHILS # BLD: 0.1 K/UL (ref 0–0.2)
BASOPHILS NFR BLD: 1 % (ref 0–2)
BILIRUB SERPL-MCNC: 0.5 MG/DL (ref 0.2–1.2)
BILIRUB UR QL: NEGATIVE
BUN SERPL-MCNC: 14 MG/DL (ref 6–22)
CALCIUM SERPL-MCNC: 9.5 MG/DL (ref 8.4–10.5)
CHLORIDE SERPL-SCNC: 100 MMOL/L (ref 98–110)
CHOLEST SERPL-MCNC: 236 MG/DL (ref 110–200)
CLARITY: CLEAR
CO2 SERPL-SCNC: 26 MMOL/L (ref 20–32)
COLOR UR: YELLOW
CREAT SERPL-MCNC: 0.9 MG/DL (ref 0.5–1.2)
EOSINOPHIL # BLD: 0.1 K/UL (ref 0–0.5)
EOSINOPHIL NFR BLD: 2 % (ref 0–6)
EPITHELIAL,EPSU: ABNORMAL /HPF (ref 0–2)
ERYTHROCYTE [DISTWIDTH] IN BLOOD BY AUTOMATED COUNT: 12 % (ref 10–15.5)
GFRAA, 66117: >60
GFRNA, 66118: >60
GLOBULIN,GLOB: 2.7 G/DL (ref 2–4)
GLUCOSE SERPL-MCNC: 94 MG/DL (ref 70–99)
GLUCOSE UR QL: NEGATIVE MG/DL
GRANULOCYTES,GRANS: 58 % (ref 40–75)
HBA1C MFR BLD HPLC: 5.7 % (ref 4.8–5.6)
HCT VFR BLD AUTO: 49 % (ref 39.3–51.6)
HCV AB SER IA-ACNC: NORMAL
HDLC SERPL-MCNC: 39 MG/DL
HDLC SERPL-MCNC: 6.1 MG/DL (ref 0–5)
HGB BLD-MCNC: 15.4 G/DL (ref 13.1–17.2)
HGB UR QL STRIP: NEGATIVE
KETONES UR QL STRIP.AUTO: NEGATIVE MG/DL
LDL/HDL RATIO,LDHD: 4.4
LDLC SERPL CALC-MCNC: 168 MG/DL (ref 50–99)
LEUKOCYTE ESTERASE: NEGATIVE
LYMPHOCYTES, LYMLT: 32 % (ref 20–45)
MCH RBC QN AUTO: 29 PG (ref 26–34)
MCHC RBC AUTO-ENTMCNC: 31 G/DL (ref 31–36)
MCV RBC AUTO: 94 FL (ref 80–95)
MONOCYTES # BLD: 0.4 K/UL (ref 0.1–1)
MONOCYTES NFR BLD: 7 % (ref 3–12)
NEUTROPHILS # BLD AUTO: 3.7 K/UL (ref 1.8–7.7)
NITRITE UR QL STRIP.AUTO: NEGATIVE
NON-HDL CHOLESTEROL, 011976: 198 MG/DL
PH UR STRIP: 7 PH (ref 5–8)
PLATELET # BLD AUTO: 280 K/UL (ref 140–440)
PMV BLD AUTO: 12.1 FL (ref 9–13)
POTASSIUM SERPL-SCNC: 4.8 MMOL/L (ref 3.5–5.5)
PROT SERPL-MCNC: 7.3 G/DL (ref 6.4–8.3)
PROT UR QL STRIP: ABNORMAL MG/DL
RBC # BLD AUTO: 5.23 M/UL (ref 3.8–5.8)
RBC #/AREA URNS HPF: ABNORMAL /HPF
SODIUM SERPL-SCNC: 138 MMOL/L (ref 133–145)
SP GR UR: 1.02 (ref 1–1.03)
TRIGL SERPL-MCNC: 149 MG/DL (ref 40–149)
TSH SERPL DL<=0.005 MIU/L-ACNC: 1.81 MCU/ML (ref 0.27–4.2)
URINE ASCORBIC ACID: ABNORMAL MG/DL
UROBILINOGEN UR STRIP-MCNC: <2 MG/DL
VLDLC SERPL CALC-MCNC: 30 MG/DL (ref 8–30)
WBC # BLD AUTO: 6.3 K/UL (ref 4–11)
WBC URNS QL MICRO: ABNORMAL /HPF

## 2021-10-22 PROCEDURE — 99396 PREV VISIT EST AGE 40-64: CPT | Performed by: FAMILY MEDICINE

## 2021-10-22 RX ORDER — CHOLECALCIFEROL TAB 125 MCG (5000 UNIT) 125 MCG
TAB ORAL DAILY
COMMUNITY

## 2021-10-22 RX ORDER — ASCORBIC ACID 500 MG
1000 TABLET ORAL DAILY
COMMUNITY
End: 2022-10-17

## 2021-10-22 NOTE — PATIENT INSTRUCTIONS
Lab results pending, notification of results will be provided  Referral for colonoscopy  Avoid dietary starch and sugar and follow program of regular aerobic exercise  Return for annual physical exam and follow-up in 1 year or sooner with any problems

## 2021-10-22 NOTE — PROGRESS NOTES
Susie Enriquez is a 51 y.o. male (: 1975) presenting to address:    Chief Complaint   Patient presents with    Complete Physical    Immunization/Injection     declined flu shot . Vitals:    10/22/21 1013   BP: 110/70   Pulse: 87   Resp: 16   Temp: 98 °F (36.7 °C)   SpO2: 97%   Weight: 214 lb (97.1 kg)   Height: 5' 11\" (1.803 m)   PainSc:   0 - No pain       Hearing/Vision:   No exam data present    Learning Assessment:     Learning Assessment 10/21/2020   PRIMARY LEARNER Patient   HIGHEST LEVEL OF EDUCATION - PRIMARY LEARNER  > 4 YEARS OF COLLEGE   BARRIERS PRIMARY LEARNER NONE   CO-LEARNER CAREGIVER No   PRIMARY LANGUAGE ENGLISH    NEED No   LEARNER PREFERENCE PRIMARY DEMONSTRATION   ANSWERED BY patient   RELATIONSHIP SELF     Depression Screening:     3 most recent PHQ Screens 10/22/2021   PHQ Not Done -   Little interest or pleasure in doing things Not at all   Feeling down, depressed, irritable, or hopeless Not at all   Total Score PHQ 2 0     Fall Risk Assessment:     Fall Risk Assessment, last 12 mths 10/22/2021   Able to walk? Yes   Fall in past 12 months? 0   Do you feel unsteady? 0   Are you worried about falling 0     Abuse Screening:     Abuse Screening Questionnaire 10/21/2020   Do you ever feel afraid of your partner? N   Are you in a relationship with someone who physically or mentally threatens you? N   Is it safe for you to go home? Y     Coordination of Care Questionaire:   1. Have you been to the ER, urgent care clinic since your last visit? Hospitalized since your last visit? NO    2. Have you seen or consulted any other health care providers outside of the [address] [place] [address] since your last visit? Include any pap smears or colon screening. NO    Advanced Directive:   1. Do you have an Advanced Directive? NO    2. Would you like information on Advanced Directives?  NO

## 2021-10-22 NOTE — PROGRESS NOTES
HISTORY OF PRESENT ILLNESS  Deshawn Correa is a 51 y.o. male. He presents for health assessment, preventative care and follow-up with a history of prediabetes, hyperlipidemia, obesity    Mr#: [id number]      Past Medical History:   Diagnosis Date    Groin pain     Mixed hyperlipidemia 10/22/2021    Testicular pain        Past Surgical History:   Procedure Laterality Date    HX CYST REMOVAL      neck       Family History   Problem Relation Age of Onset    No Known Problems Mother     Heart Disease Father     Hypertension Father     Lung Disease Father     Diabetes Father     High Cholesterol Father     No Known Problems Brother     No Known Problems Brother        No Known Allergies    Social History     Tobacco Use   Smoking Status Former Smoker    Packs/day: 0.50    Quit date: 2013    Years since quittin.1   Smokeless Tobacco Never Used   Tobacco Comment    Patient Vapes       Social History     Substance and Sexual Activity   Alcohol Use Yes    Alcohol/week: 4.0 standard drinks    Types: 2 Glasses of wine, 2 Shots of liquor per week           Patient Active Problem List   Diagnosis Code    Testicular pain N50.819    Mixed hyperlipidemia E78.2    Prediabetes R73.03         Current Outpatient Medications:     ascorbic acid, vitamin C, (Vitamin C) 500 mg tablet, Take 1,000 mg by mouth daily. , Disp: , Rfl:     cholecalciferol (VITAMIN D3) (5000 Units/125 mcg) tab tablet, Take  by mouth daily. , Disp: , Rfl:     omeprazole (PRILOSEC) 40 mg capsule, Take 40 mg by mouth daily. , Disp: , Rfl:       Review of Systems   Constitutional: Negative for chills, fever and weight loss. HENT: Negative for congestion, ear pain, hearing loss and sore throat. Sense of taste and smell waxes and wane since Covid 19 infection in January   Eyes: Negative for blurred vision and double vision. Respiratory: Negative for cough, shortness of breath and wheezing.     Cardiovascular: Negative for chest pain, palpitations and leg swelling. Gastrointestinal: Negative for abdominal pain, blood in stool, constipation, diarrhea, heartburn, melena, nausea and vomiting. Genitourinary: Negative for dysuria and urgency. Musculoskeletal: Negative for joint pain and myalgias. Skin: Negative for itching and rash. Neurological: Negative for dizziness, tingling, sensory change, focal weakness and headaches. Endo/Heme/Allergies: Positive for environmental allergies. Psychiatric/Behavioral: Negative for depression. The patient is not nervous/anxious and does not have insomnia. Visit Vitals  /70   Pulse 87   Temp 98 °F (36.7 °C)   Resp 16   Ht 5' 11\" (1.803 m)   Wt 214 lb (97.1 kg)   SpO2 97%   BMI 29.85 kg/m²       Physical Exam  Vitals and nursing note reviewed. Constitutional:       General: He is not in acute distress. Appearance: Normal appearance. He is obese. He is not ill-appearing. HENT:      Head: Normocephalic. Right Ear: Tympanic membrane, ear canal and external ear normal.      Left Ear: Tympanic membrane, ear canal and external ear normal.   Eyes:      Extraocular Movements: Extraocular movements intact. Conjunctiva/sclera: Conjunctivae normal.      Pupils: Pupils are equal, round, and reactive to light. Neck:      Vascular: No carotid bruit. Cardiovascular:      Rate and Rhythm: Normal rate and regular rhythm. Heart sounds: Normal heart sounds. Pulmonary:      Effort: Pulmonary effort is normal.      Breath sounds: Normal breath sounds. Abdominal:      Palpations: Abdomen is soft. Tenderness: There is no abdominal tenderness. Musculoskeletal:         General: No deformity. Cervical back: Neck supple. Right lower leg: No edema. Left lower leg: No edema. Skin:     General: Skin is warm and dry. Neurological:      General: No focal deficit present. Mental Status: He is alert and oriented to person, place, and time.    Psychiatric: Mood and Affect: Mood normal.         Behavior: Behavior normal.         ASSESSMENT and PLAN    ICD-10-CM ICD-9-CM    1. Routine general medical examination at a health care facility  Z00.00 V70.0    2. Mixed hyperlipidemia  E78.2 272.2    3. Prediabetes  R73.03 790.29    4. Obesity (BMI 30.0-34. 9)  E66.9 278.00    5. Colon cancer screening  Z12.11 V76.51 REFERRAL TO GASTROENTEROLOGY   Assessment:  Hyperlipidemia and prediabetes, status to be determined pending lab results    Health maintenance recommendations:  Influenza immunization declined  Colorectal cancer screening now recommended to begin at age 39    Plan:   Lab results pending, notification of results will be provided  Referral for colonoscopy  Avoid dietary starch and sugar and follow program of regular aerobic exercise  Return for annual physical exam and follow-up in 1 year or sooner with any problems    Nathaniel Vidal MD      PLEASE NOTE:   This document has been produced using voice recognition software. Unrecognized errors in transcription may be present.

## 2021-10-27 DIAGNOSIS — R80.9 PROTEINURIA, UNSPECIFIED TYPE: ICD-10-CM

## 2021-10-27 DIAGNOSIS — E78.2 MIXED HYPERLIPIDEMIA: Primary | ICD-10-CM

## 2021-10-27 RX ORDER — ATORVASTATIN CALCIUM 20 MG/1
20 TABLET, FILM COATED ORAL DAILY
Qty: 90 TABLET | Refills: 3 | Status: SHIPPED | OUTPATIENT
Start: 2021-10-27 | End: 2022-09-22

## 2021-10-27 NOTE — PROGRESS NOTES
Please advise . Xiao Cordova that his lab results show a significant increase in his cholesterol levels to the point where it would be safest to start him on a cholesterol-lowering medication. A prescription has been sent to his pharmacy for atorvastatin 40 mg daily. His hemoglobin A1c which measures his average glucose level daily over the past 3 months is still in the prediabetes range. Both the cholesterol levels and the prediabetes would benefit from decreasing dietary starch and sugar and following a program of regular aerobic exercise. Finally his urinalysis shows that the urine is very concentrated and contains some protein. Most likely this would not be the case if he was better hydrated. Please ask him to increase his fluid consumption and and assist him in scheduling a lab appointment for repeat cholesterol levels and a repeat urine test after his been hydrated and taking the cholesterol medication for 2 months.   Lab orders have been entered

## 2022-03-18 PROBLEM — R73.03 PREDIABETES: Status: ACTIVE | Noted: 2021-10-22

## 2022-03-20 PROBLEM — E78.2 MIXED HYPERLIPIDEMIA: Status: ACTIVE | Noted: 2021-10-22

## 2022-09-22 DIAGNOSIS — E78.2 MIXED HYPERLIPIDEMIA: ICD-10-CM

## 2022-09-22 RX ORDER — ATORVASTATIN CALCIUM 20 MG/1
TABLET, FILM COATED ORAL
Qty: 90 TABLET | Refills: 0 | Status: SHIPPED | OUTPATIENT
Start: 2022-09-22 | End: 2022-10-17

## 2022-09-23 DIAGNOSIS — R73.03 PREDIABETES: ICD-10-CM

## 2022-09-23 DIAGNOSIS — E78.2 MIXED HYPERLIPIDEMIA: ICD-10-CM

## 2022-09-23 DIAGNOSIS — Z00.00 ROUTINE HEALTH MAINTENANCE: Primary | ICD-10-CM

## 2022-10-17 ENCOUNTER — OFFICE VISIT (OUTPATIENT)
Dept: FAMILY MEDICINE CLINIC | Age: 47
End: 2022-10-17
Payer: COMMERCIAL

## 2022-10-17 ENCOUNTER — TELEPHONE (OUTPATIENT)
Dept: FAMILY MEDICINE CLINIC | Age: 47
End: 2022-10-17

## 2022-10-17 VITALS
TEMPERATURE: 98.4 F | OXYGEN SATURATION: 97 % | WEIGHT: 202 LBS | RESPIRATION RATE: 16 BRPM | SYSTOLIC BLOOD PRESSURE: 118 MMHG | DIASTOLIC BLOOD PRESSURE: 86 MMHG | HEART RATE: 100 BPM | BODY MASS INDEX: 28.28 KG/M2 | HEIGHT: 71 IN

## 2022-10-17 DIAGNOSIS — Z23 NEEDS FLU SHOT: ICD-10-CM

## 2022-10-17 DIAGNOSIS — F41.9 ANXIETY AND DEPRESSION: Primary | ICD-10-CM

## 2022-10-17 DIAGNOSIS — F32.A ANXIETY AND DEPRESSION: Primary | ICD-10-CM

## 2022-10-17 DIAGNOSIS — Z00.00 ROUTINE HEALTH MAINTENANCE: Primary | ICD-10-CM

## 2022-10-17 PROCEDURE — 99213 OFFICE O/P EST LOW 20 MIN: CPT | Performed by: FAMILY MEDICINE

## 2022-10-17 PROCEDURE — 90686 IIV4 VACC NO PRSV 0.5 ML IM: CPT | Performed by: FAMILY MEDICINE

## 2022-10-17 PROCEDURE — 90471 IMMUNIZATION ADMIN: CPT | Performed by: FAMILY MEDICINE

## 2022-10-17 RX ORDER — SERTRALINE HYDROCHLORIDE 25 MG/1
25 TABLET, FILM COATED ORAL DAILY
Qty: 30 TABLET | Refills: 0 | Status: SHIPPED | OUTPATIENT
Start: 2022-10-17 | End: 2022-10-25 | Stop reason: SDDI

## 2022-10-17 NOTE — PATIENT INSTRUCTIONS
Assessment:  Symptoms of depression with anxiety    Health maintenance recommendations:  COVID-19 immunization with bivalent vaccine  Influenza immunization today    Plan:  Return for lab studies followed by annual physical exam and follow-up appointment later this month as scheduled  Please always arrive at least 15 minutes before your scheduled appointment time

## 2022-10-17 NOTE — PROGRESS NOTES
HISTORY OF PRESENT ILLNESS  Jaquan Beltran is a 49 y.o. male. He presents reporting symptoms of depression including. He reported anhedonia and loss of motivation previously and was started on bupropion  mg daily in the morning in October 2021. He subsequently reported stopping the bupropion XL after 5 days because of perceived dizziness. He had been notified that his hemoglobin A1c had progressed to the prediabetes range based on lab results from October 2020. At his follow-up in November he reported that he had become very motivated to improve his lifestyle, had lost 10 pounds and that as a result he felt his symptoms of depression had resolved. He is episodically having more severe symptoms of depression. Early this morning he felt very depressed and as though he might have a heart attack or stroke but did not specifically have chest pain or palpitations. Notes that his work is stressful but his marital relationship is essentially nonexistent with his wife living in another state. Like to have a good relationship and children which would not be possible in this relationship. He worries about health problems and notes that a friend about his age recently had a heart attack. He has a family history of premature coronary artery disease in his father. He does not smoke and does not have high blood pressure. Cholesterol levels have not been elevated. This afternoon he reports that he feels much better than he did this morning. He specifically denies any suicidal ideation. He reports that he is sleeping reasonably well. He has had some decrease in appetite and some decrease in interest in social activities but has continued to participate. He does not have the lack of motivation he reported back in March or 2020.     Mr#: [id number]      Past Medical History:   Diagnosis Date    Groin pain     Mixed hyperlipidemia 10/22/2021    Testicular pain        Past Surgical History:   Procedure Laterality Date HX COLONOSCOPY  11/15/2021    Normal study, repeat 10 years, Dr. Moses Gregory    HX CYST REMOVAL      neck       Family History   Problem Relation Age of Onset    No Known Problems Mother     Heart Disease Father     Hypertension Father     Lung Disease Father     Diabetes Father     High Cholesterol Father     No Known Problems Brother     No Known Problems Brother        No Known Allergies    Social History     Tobacco Use   Smoking Status Former    Packs/day: 0.50    Types: Cigarettes    Quit date: 2013    Years since quittin.1   Smokeless Tobacco Never   Tobacco Comments    Patient Vapes       Social History     Substance and Sexual Activity   Alcohol Use Yes    Alcohol/week: 4.0 standard drinks    Types: 2 Glasses of wine, 2 Shots of liquor per week           Patient Active Problem List   Diagnosis Code    Testicular pain N50.819    Mixed hyperlipidemia E78.2    Prediabetes R73.03         Current Outpatient Medications:     cholecalciferol (VITAMIN D3) (5000 Units/125 mcg) tab tablet, Take  by mouth daily. , Disp: , Rfl:     omeprazole (PRILOSEC) 40 mg capsule, Take 40 mg by mouth daily. , Disp: , Rfl:       Review of Systems   Constitutional:  Positive for malaise/fatigue. Negative for fever and weight loss. Respiratory:  Negative for shortness of breath. Cardiovascular:  Negative for chest pain and palpitations. Psychiatric/Behavioral:  Positive for depression. Negative for suicidal ideas. The patient is nervous/anxious. The patient does not have insomnia. See HPI     Visit Vitals  /86   Pulse 100   Temp 98.4 °F (36.9 °C) (Temporal)   Resp 16   Ht 5' 11\" (1.803 m)   Wt 202 lb (91.6 kg)   SpO2 97%   BMI 28.17 kg/m²       Physical Exam  Vitals and nursing note reviewed. Constitutional:       General: He is not in acute distress. Appearance: Normal appearance. He is well-developed. He is not ill-appearing. HENT:      Head: Normocephalic.    Eyes:      Extraocular Movements: Extraocular movements intact. Cardiovascular:      Rate and Rhythm: Normal rate. Pulmonary:      Effort: Pulmonary effort is normal.   Neurological:      Mental Status: He is alert and oriented to person, place, and time. Psychiatric:         Mood and Affect: Mood normal.         Behavior: Behavior normal.         Thought Content: Thought content normal.         Judgment: Judgment normal.       ASSESSMENT and PLAN    ICD-10-CM ICD-9-CM    1. Anxiety and depression  F41.9 300.00 sertraline (ZOLOFT) 25 mg tablet    F32. A 311       2. Needs flu shot  Z23 V04.81 INFLUENZA, FLUARIX, FLULAVAL, FLUZONE (AGE 6 MO+), AFLURIA(AGE 3Y+) IM, PF, 0.5 ML      Assessment:  Symptoms of depression with anxiety    Health maintenance recommendations:  COVID-19 immunization with bivalent vaccine  Influenza immunization today    Plan:  Return for lab studies followed by annual physical exam and follow-up appointment later this month as scheduled  Please always arrive at least 15 minutes before your scheduled appointment time  Janae Harris MD      PLEASE NOTE:   This document has been produced using voice recognition software. Unrecognized errors in transcription may be present.

## 2022-10-17 NOTE — PROGRESS NOTES
Ben Trevino is a 49 y.o. male (: 1975) presenting to address:    Chief Complaint   Patient presents with    Depression    Immunization/Injection     Will speak with the Dr isn't sure if he wants a flu shot . Vitals:    10/17/22 1333   BP: 118/86   Pulse: 100   Resp: 16   Temp: 98.4 °F (36.9 °C)   TempSrc: Temporal   SpO2: 97%   Weight: 202 lb (91.6 kg)   Height: 5' 11\" (1.803 m)   PainSc:   0 - No pain       Hearing/Vision:   No results found. Learning Assessment:     Learning Assessment 10/21/2020   PRIMARY LEARNER Patient   HIGHEST LEVEL OF EDUCATION - PRIMARY LEARNER  > 4 YEARS OF COLLEGE   BARRIERS PRIMARY LEARNER NONE   CO-LEARNER CAREGIVER No   PRIMARY LANGUAGE ENGLISH    NEED No   LEARNER PREFERENCE PRIMARY DEMONSTRATION   ANSWERED BY patient   RELATIONSHIP SELF     Depression Screening:     3 most recent PHQ Screens 10/17/2022   PHQ Not Done -   Little interest or pleasure in doing things More than half the days   Feeling down, depressed, irritable, or hopeless More than half the days   Total Score PHQ 2 4   Trouble falling or staying asleep, or sleeping too much Several days   Feeling tired or having little energy More than half the days   Poor appetite, weight loss, or overeating Nearly every day   Feeling bad about yourself - or that you are a failure or have let yourself or your family down Not at all   Trouble concentrating on things such as school, work, reading, or watching TV Not at all   Moving or speaking so slowly that other people could have noticed; or the opposite being so fidgety that others notice Not at all   Thoughts of being better off dead, or hurting yourself in some way Not at all   PHQ 9 Score 10     Fall Risk Assessment:     Fall Risk Assessment, last 12 mths 10/22/2021   Able to walk? Yes   Fall in past 12 months? 0   Do you feel unsteady?  0   Are you worried about falling 0     Abuse Screening:     Abuse Screening Questionnaire 10/21/2020   Do you ever feel afraid of your partner? N   Are you in a relationship with someone who physically or mentally threatens you? N   Is it safe for you to go home? Y     ADL Assessment:   No flowsheet data found. Coordination of Care Questionaire:   1. \"Have you been to the ER, urgent care clinic since your last visit? Hospitalized since your last visit? \" No    2. \"Have you seen or consulted any other health care providers outside of the [address] [place] [address] since your last visit? \" No     3. For patients aged 39-70: Has the patient had a colonoscopy? Yes - no Care Gap present     Advanced Directive:   1. Do you have an Advanced Directive? NO    2. Would you like information on Advanced Directives? NO  Flu shot Immunization/s administered 10/17/2022 by Herminio Matias LPN with guardian's consent. Patient tolerated procedure well. No reactions noted.

## 2022-10-17 NOTE — TELEPHONE ENCOUNTER
Please advise that a ferritin level has been added to the patient's lab orders however I am unable to find any appropriate diagnosis to attach.   Please make sure he understands that there is a chance that his insurance will not cover this test.

## 2022-10-20 ENCOUNTER — APPOINTMENT (OUTPATIENT)
Dept: FAMILY MEDICINE CLINIC | Age: 47
End: 2022-10-20

## 2022-10-20 DIAGNOSIS — Z00.00 ROUTINE HEALTH MAINTENANCE: ICD-10-CM

## 2022-10-20 DIAGNOSIS — E78.2 MIXED HYPERLIPIDEMIA: ICD-10-CM

## 2022-10-20 DIAGNOSIS — R73.03 PREDIABETES: ICD-10-CM

## 2022-10-20 LAB
A-G RATIO,AGRAT: 1.8 RATIO (ref 1.1–2.6)
ABSOLUTE LYMPHOCYTE COUNT, 10803: 1.8 K/UL (ref 1–4.8)
ALBUMIN SERPL-MCNC: 4.8 G/DL (ref 3.5–5)
ALP SERPL-CCNC: 55 U/L (ref 25–115)
ALT SERPL-CCNC: 16 U/L (ref 5–40)
ANION GAP SERPL CALC-SCNC: 11 MMOL/L (ref 3–15)
AST SERPL W P-5'-P-CCNC: 13 U/L (ref 10–37)
AVG GLU, 10930: 115 MG/DL (ref 91–123)
BACTERIA,BACTU: NEGATIVE
BASOPHILS # BLD: 0.1 K/UL (ref 0–0.2)
BASOPHILS NFR BLD: 1 % (ref 0–2)
BILIRUB SERPL-MCNC: 0.6 MG/DL (ref 0.2–1.2)
BILIRUB UR QL: NEGATIVE
BUN SERPL-MCNC: 11 MG/DL (ref 6–22)
CALCIUM SERPL-MCNC: 9.5 MG/DL (ref 8.4–10.5)
CHLORIDE SERPL-SCNC: 102 MMOL/L (ref 98–110)
CHOLEST SERPL-MCNC: 195 MG/DL (ref 110–200)
CLARITY: CLEAR
CO2 SERPL-SCNC: 27 MMOL/L (ref 20–32)
COLOR UR: YELLOW
CREAT SERPL-MCNC: 1 MG/DL (ref 0.5–1.2)
EOSINOPHIL # BLD: 0.1 K/UL (ref 0–0.5)
EOSINOPHIL NFR BLD: 1 % (ref 0–6)
EPITHELIAL,EPSU: ABNORMAL /HPF (ref 0–2)
ERYTHROCYTE [DISTWIDTH] IN BLOOD BY AUTOMATED COUNT: 12.3 % (ref 10–15.5)
GLOBULIN,GLOB: 2.6 G/DL (ref 2–4)
GLOMERULAR FILTRATION RATE: >60 ML/MIN/1.73 SQ.M.
GLUCOSE SERPL-MCNC: 97 MG/DL (ref 70–99)
GLUCOSE UR QL: NEGATIVE MG/DL
GRANULOCYTES,GRANS: 56 % (ref 40–75)
HBA1C MFR BLD HPLC: 5.6 % (ref 4.8–5.6)
HCT VFR BLD AUTO: 47.4 % (ref 39.3–51.6)
HDLC SERPL-MCNC: 39 MG/DL
HDLC SERPL-MCNC: 5 MG/DL (ref 0–5)
HGB BLD-MCNC: 15.6 G/DL (ref 13.1–17.2)
HGB UR QL STRIP: NEGATIVE
HYLINE CAST, 6014: ABNORMAL /LPF (ref 0–2)
KETONES UR QL STRIP.AUTO: ABNORMAL MG/DL
LDL/HDL RATIO,LDHD: 3.3
LDLC SERPL CALC-MCNC: 130 MG/DL (ref 50–99)
LEUKOCYTE ESTERASE: NEGATIVE
LYMPHOCYTES, LYMLT: 33 % (ref 20–45)
MCH RBC QN AUTO: 30 PG (ref 26–34)
MCHC RBC AUTO-ENTMCNC: 33 G/DL (ref 31–36)
MCV RBC AUTO: 91 FL (ref 80–95)
MONOCYTES # BLD: 0.5 K/UL (ref 0.1–1)
MONOCYTES NFR BLD: 8 % (ref 3–12)
NEUTROPHILS # BLD AUTO: 3.2 K/UL (ref 1.8–7.7)
NITRITE UR QL STRIP.AUTO: NEGATIVE
NON-HDL CHOLESTEROL, 011976: 156 MG/DL
PH UR STRIP: 6 PH (ref 5–8)
PLATELET # BLD AUTO: 271 K/UL (ref 140–440)
PMV BLD AUTO: 12.6 FL (ref 9–13)
POTASSIUM SERPL-SCNC: 4.8 MMOL/L (ref 3.5–5.5)
PROT SERPL-MCNC: 7.4 G/DL (ref 6.4–8.3)
PROT UR QL STRIP: ABNORMAL MG/DL
RBC # BLD AUTO: 5.21 M/UL (ref 3.8–5.8)
RBC #/AREA URNS HPF: ABNORMAL /HPF
SODIUM SERPL-SCNC: 140 MMOL/L (ref 133–145)
SP GR UR: 1.02 (ref 1–1.03)
TRIGL SERPL-MCNC: 131 MG/DL (ref 40–149)
TSH SERPL DL<=0.005 MIU/L-ACNC: 1.61 MCU/ML (ref 0.27–4.2)
UROBILINOGEN UR STRIP-MCNC: 0.2 MG/DL
VLDLC SERPL CALC-MCNC: 26 MG/DL (ref 8–30)
WBC # BLD AUTO: 5.6 K/UL (ref 4–11)
WBC URNS QL MICRO: ABNORMAL /HPF (ref 0–2)

## 2022-10-23 PROBLEM — F32.A ANXIETY AND DEPRESSION: Status: ACTIVE | Noted: 2022-10-23

## 2022-10-23 PROBLEM — F41.9 ANXIETY AND DEPRESSION: Status: ACTIVE | Noted: 2022-10-23

## 2022-10-23 NOTE — PROGRESS NOTES
HISTORY OF PRESENT ILLNESS  Gloria Mims is a 49 y.o. male. He presents for health assessment, preventative care and follow-up with a history of hyperlipidemia and prediabetes. He has previously been seen with symptoms of anxiety and depression but has not tolerated attempted treatment with medication in the past.  He presented 6 days ago reporting that he had become extremely distraught and overwhelmed, concerned that he might have some catastrophic health issue develop associated with his feelings of stress. Today he reports that he is feeling much better. He feels sure now that his previous symptoms were associated with a series of situational issues and he feels well in control of the situation and his emotions at this point. Mr#: [id number]      Past Medical History:   Diagnosis Date    Anxiety and depression 10/23/2022    Groin pain     Mixed hyperlipidemia 10/22/2021    Testicular pain        Past Surgical History:   Procedure Laterality Date    HX COLONOSCOPY  11/15/2021    Normal study, repeat 10 years, Dr. Juan Pagan HX CYST REMOVAL      neck       Family History   Problem Relation Age of Onset    No Known Problems Mother     Heart Disease Father     Hypertension Father     Lung Disease Father     Diabetes Father     High Cholesterol Father     No Known Problems Brother     No Known Problems Brother        No Known Allergies    Social History     Tobacco Use   Smoking Status Former    Packs/day: 0.50    Types: Cigarettes    Quit date: 2013    Years since quittin.1   Smokeless Tobacco Never   Tobacco Comments    Patient Vapes       Social History     Substance and Sexual Activity   Alcohol Use Yes    Alcohol/week: 4.0 standard drinks    Types: 2 Glasses of wine, 2 Shots of liquor per week           Patient Active Problem List   Diagnosis Code    Testicular pain N50.819    Mixed hyperlipidemia E78.2    Prediabetes R73.03    Anxiety and depression F41.9, F32. A Current Outpatient Medications:     cholecalciferol (VITAMIN D3) (5000 Units/125 mcg) tab tablet, Take  by mouth daily. , Disp: , Rfl:     omeprazole (PRILOSEC) 40 mg capsule, Take 40 mg by mouth daily. , Disp: , Rfl:       Review of Systems   Constitutional:  Negative for chills, fever and weight loss. HENT:  Negative for congestion, ear pain, hearing loss and sore throat. Eyes:  Negative for blurred vision and double vision. Respiratory:  Negative for cough, shortness of breath and wheezing. Cardiovascular:  Negative for chest pain, palpitations and leg swelling. Gastrointestinal:  Positive for heartburn (not responding to omeprazole, has tried taking Pepcid as well with some improvement, declines any additional prescriptions). Negative for abdominal pain, blood in stool, constipation, diarrhea, melena, nausea and vomiting. Genitourinary:  Negative for dysuria and urgency. Musculoskeletal:  Negative for joint pain and myalgias. Skin:  Negative for itching and rash. Neurological:  Negative for dizziness, tingling, sensory change, focal weakness and headaches. Endo/Heme/Allergies:  Negative for environmental allergies. Psychiatric/Behavioral:  Negative for depression and suicidal ideas. The patient is not nervous/anxious and does not have insomnia. Stressed, feels better     Visit Vitals  BP 98/64   Pulse 81   Temp 98.2 °F (36.8 °C) (Temporal)   Resp 16   Ht 5' 11\" (1.803 m)   Wt 202 lb (91.6 kg)   SpO2 96%   BMI 28.17 kg/m²       Physical Exam  Vitals and nursing note reviewed. Constitutional:       General: He is not in acute distress. Appearance: Normal appearance. He is not ill-appearing. HENT:      Head: Normocephalic. Right Ear: Tympanic membrane, ear canal and external ear normal.      Left Ear: Tympanic membrane, ear canal and external ear normal.      Mouth/Throat:      Mouth: Mucous membranes are moist.      Pharynx: Oropharynx is clear.    Eyes: Extraocular Movements: Extraocular movements intact. Conjunctiva/sclera: Conjunctivae normal.      Pupils: Pupils are equal, round, and reactive to light. Neck:      Vascular: No carotid bruit. Cardiovascular:      Rate and Rhythm: Normal rate and regular rhythm. Heart sounds: Normal heart sounds. Pulmonary:      Effort: Pulmonary effort is normal.      Breath sounds: Normal breath sounds. Abdominal:      Palpations: Abdomen is soft. Tenderness: There is no abdominal tenderness. Musculoskeletal:         General: No deformity. Cervical back: Neck supple. Right lower leg: No edema. Left lower leg: No edema. Skin:     General: Skin is warm and dry. Neurological:      General: No focal deficit present. Mental Status: He is alert and oriented to person, place, and time. Psychiatric:         Mood and Affect: Mood normal.         Behavior: Behavior normal.           ASSESSMENT and PLAN    ICD-10-CM ICD-9-CM    1. Routine general medical examination at a health care facility  Z00.00 V70.0       2. Mixed hyperlipidemia  E78.2 272.2       3. Prediabetes  R73.03 790.29       4. Anxiety and depression  F41.9 300.00     F32. A 311       Assessment:  Lipid levels are improved compared with 1 year ago. Family history could indicate consideration of a statin medication.   Hemoglobin A1c has improved from 5.7% to 5.6% no longer in the prediabetes range  Symptoms of anxiety/depression/frustration    Health maintenance recommendations:  COVID-19 immunization with bivalent vaccine    Plan:  Begin atorvastatin 20 mg daily and return for lab studies to repeat lipid levels, ALT and AST in 2 months  Avoid dietary starch and sugar and follow program of regular aerobic exercise  Return for lab and physical exam in November 2023 or sooner with any problems  Please always arrive at least 15 minutes before your scheduled appointment time  Hola Varma MD      PLEASE NOTE:   This document has been produced using voice recognition software. Unrecognized errors in transcription may be present.

## 2022-10-25 ENCOUNTER — OFFICE VISIT (OUTPATIENT)
Dept: FAMILY MEDICINE CLINIC | Age: 47
End: 2022-10-25
Payer: COMMERCIAL

## 2022-10-25 VITALS
WEIGHT: 202 LBS | TEMPERATURE: 98.2 F | SYSTOLIC BLOOD PRESSURE: 98 MMHG | RESPIRATION RATE: 16 BRPM | BODY MASS INDEX: 28.28 KG/M2 | HEART RATE: 81 BPM | HEIGHT: 71 IN | OXYGEN SATURATION: 96 % | DIASTOLIC BLOOD PRESSURE: 64 MMHG

## 2022-10-25 DIAGNOSIS — F41.9 ANXIETY AND DEPRESSION: ICD-10-CM

## 2022-10-25 DIAGNOSIS — Z00.00 ROUTINE GENERAL MEDICAL EXAMINATION AT A HEALTH CARE FACILITY: Primary | ICD-10-CM

## 2022-10-25 DIAGNOSIS — F32.A ANXIETY AND DEPRESSION: ICD-10-CM

## 2022-10-25 DIAGNOSIS — E78.2 MIXED HYPERLIPIDEMIA: ICD-10-CM

## 2022-10-25 DIAGNOSIS — R73.03 PREDIABETES: ICD-10-CM

## 2022-10-25 PROCEDURE — 99396 PREV VISIT EST AGE 40-64: CPT | Performed by: FAMILY MEDICINE

## 2022-10-25 NOTE — PROGRESS NOTES
Flavio Lunsford is a 49 y.o. male (: 1975) presenting to address:    Chief Complaint   Patient presents with    Complete Physical    Depression     Didn't start medication . Vitals:    10/25/22 1034   BP: 98/64   Pulse: 81   Resp: 16   Temp: 98.2 °F (36.8 °C)   TempSrc: Temporal   SpO2: 96%   Weight: 202 lb (91.6 kg)   Height: 5' 11\" (1.803 m)   PainSc:   0 - No pain       Hearing/Vision:   No results found. Learning Assessment:     Learning Assessment 10/21/2020   PRIMARY LEARNER Patient   HIGHEST LEVEL OF EDUCATION - PRIMARY LEARNER  > 4 YEARS OF COLLEGE   BARRIERS PRIMARY LEARNER NONE   CO-LEARNER CAREGIVER No   PRIMARY LANGUAGE ENGLISH    NEED No   LEARNER PREFERENCE PRIMARY DEMONSTRATION   ANSWERED BY patient   RELATIONSHIP SELF     Depression Screening:     3 most recent PHQ Screens 10/17/2022   PHQ Not Done -   Little interest or pleasure in doing things More than half the days   Feeling down, depressed, irritable, or hopeless More than half the days   Total Score PHQ 2 4   Trouble falling or staying asleep, or sleeping too much Several days   Feeling tired or having little energy More than half the days   Poor appetite, weight loss, or overeating Nearly every day   Feeling bad about yourself - or that you are a failure or have let yourself or your family down Not at all   Trouble concentrating on things such as school, work, reading, or watching TV Not at all   Moving or speaking so slowly that other people could have noticed; or the opposite being so fidgety that others notice Not at all   Thoughts of being better off dead, or hurting yourself in some way Not at all   PHQ 9 Score 10     Fall Risk Assessment:     Fall Risk Assessment, last 12 mths 10/22/2021   Able to walk? Yes   Fall in past 12 months? 0   Do you feel unsteady? 0   Are you worried about falling 0     Abuse Screening:     Abuse Screening Questionnaire 10/21/2020   Do you ever feel afraid of your partner?  N   Are you in a relationship with someone who physically or mentally threatens you? N   Is it safe for you to go home? Y     ADL Assessment:   No flowsheet data found. Coordination of Care Questionaire:   1. \"Have you been to the ER, urgent care clinic since your last visit? Hospitalized since your last visit? \" No    2. \"Have you seen or consulted any other health care providers outside of the [address] [place] [address] since your last visit? \" No     3. For patients aged 39-70: Has the patient had a colonoscopy? Yes - no Care Gap present       Advanced Directive:   1. Do you have an Advanced Directive? NO    2. Would you like information on Advanced Directives?  NO

## 2022-10-25 NOTE — PATIENT INSTRUCTIONS
Assessment:  Lipid levels are improved compared with 1 year ago. Family history could indicate consideration of a statin medication.   Hemoglobin A1c has improved from 5.7% to 5.6% no longer in the prediabetes range  Symptoms of anxiety/depression/frustration improved    Health maintenance recommendations:  COVID-19 immunization with bivalent vaccine    Plan:  Avoid dietary starch and sugar and follow program of regular aerobic exercise  Return for lab and physical exam in November 2023 or sooner with any problems  Please always arrive at least 15 minutes before your scheduled appointment time

## 2023-11-28 DIAGNOSIS — R73.03 PREDIABETES: ICD-10-CM

## 2023-11-28 DIAGNOSIS — Z00.00 ROUTINE HEALTH MAINTENANCE: Primary | ICD-10-CM

## 2023-11-28 DIAGNOSIS — E78.2 MIXED HYPERLIPIDEMIA: ICD-10-CM

## 2023-11-28 ASSESSMENT — ENCOUNTER SYMPTOMS
COUGH: 0
CHEST TIGHTNESS: 0
WHEEZING: 0
BLOOD IN STOOL: 0
ABDOMINAL PAIN: 0
NAUSEA: 0
SHORTNESS OF BREATH: 0
DIARRHEA: 0
SORE THROAT: 0
ANAL BLEEDING: 0
VOMITING: 0
CONSTIPATION: 0

## 2023-11-28 NOTE — PROGRESS NOTES
HISTORY OF PRESENT ILLNESS  Maine Woodward  is a 51 y.o. y.o. male    He presents for health assessment, preventative  care and follow-up with a history of hyperlipidemia and prediabetes.           Mr#: [id number]      Past Medical History:   Diagnosis Date    Anxiety and depression 10/23/2022    Groin pain     Mixed hyperlipidemia 10/22/2021    Testicular pain        Past Surgical History:   Procedure Laterality Date    COLONOSCOPY  11/15/2021    Normal study, repeat 10 years, Dr. Mccarthy Fu      neck       Family History   Problem Relation Age of Onset    No Known Problems Mother     Heart Disease Father     High Cholesterol Father     Diabetes Father     No Known Problems Brother     No Known Problems Brother     Lung Disease Father     Hypertension Father        Not on File    Social History     Tobacco Use   Smoking Status Former    Packs/day: .5    Types: Cigarettes    Quit date: 9/4/2013    Years since quitting: 10.2   Smokeless Tobacco Never       Social History     Substance and Sexual Activity   Alcohol Use Yes    Alcohol/week: 4.0 standard drinks of alcohol       Immunization History   Administered Date(s) Administered    COVID-19, MODERNA BLUE border, Primary or Immunocompromised, (age 12y+), IM, 100 mcg/0.5mL 03/31/2021, 04/26/2021    Influenza, FLUARIX, FLULAVAL, FLUZONE (age 10 mo+) AND AFLURIA, (age 1 y+), PF, 0.5mL 08/16/2019, 10/21/2020, 10/17/2022    TDaP, ADACEL (age 6y-58y), Signa Sharon (age 10y+), IM, 0.5mL 08/16/2019       Patient Active Problem List   Diagnosis    Prediabetes    Testicular pain    Mixed hyperlipidemia    Anxiety and depression         Current Outpatient Medications:     vitamin D3 (CHOLECALCIFEROL) 125 MCG (5000 UT) TABS tablet, Take by mouth daily, Disp: , Rfl:     omeprazole (PRILOSEC) 40 MG delayed release capsule, Take 40 mg by mouth daily, Disp: , Rfl:       Review of Systems   Constitutional:  Negative for activity change, appetite change, fever and unexpected

## 2023-11-29 ENCOUNTER — OFFICE VISIT (OUTPATIENT)
Dept: FAMILY MEDICINE CLINIC | Facility: CLINIC | Age: 48
End: 2023-11-29
Payer: COMMERCIAL

## 2023-11-29 ENCOUNTER — NURSE ONLY (OUTPATIENT)
Dept: FAMILY MEDICINE CLINIC | Facility: CLINIC | Age: 48
End: 2023-11-29

## 2023-11-29 VITALS
HEIGHT: 71 IN | TEMPERATURE: 98.1 F | BODY MASS INDEX: 28.42 KG/M2 | SYSTOLIC BLOOD PRESSURE: 98 MMHG | WEIGHT: 203 LBS | DIASTOLIC BLOOD PRESSURE: 70 MMHG | HEART RATE: 74 BPM | OXYGEN SATURATION: 97 % | RESPIRATION RATE: 16 BRPM

## 2023-11-29 DIAGNOSIS — G47.9 SLEEP DISTURBANCE: ICD-10-CM

## 2023-11-29 DIAGNOSIS — Z00.00 ROUTINE GENERAL MEDICAL EXAMINATION AT A HEALTH CARE FACILITY: Primary | ICD-10-CM

## 2023-11-29 DIAGNOSIS — R73.03 PREDIABETES: ICD-10-CM

## 2023-11-29 DIAGNOSIS — E78.2 MIXED HYPERLIPIDEMIA: ICD-10-CM

## 2023-11-29 DIAGNOSIS — Z00.00 ROUTINE HEALTH MAINTENANCE: ICD-10-CM

## 2023-11-29 PROCEDURE — 99396 PREV VISIT EST AGE 40-64: CPT | Performed by: FAMILY MEDICINE

## 2023-11-29 SDOH — ECONOMIC STABILITY: FOOD INSECURITY: WITHIN THE PAST 12 MONTHS, YOU WORRIED THAT YOUR FOOD WOULD RUN OUT BEFORE YOU GOT MONEY TO BUY MORE.: NEVER TRUE

## 2023-11-29 SDOH — ECONOMIC STABILITY: FOOD INSECURITY: WITHIN THE PAST 12 MONTHS, THE FOOD YOU BOUGHT JUST DIDN'T LAST AND YOU DIDN'T HAVE MONEY TO GET MORE.: NEVER TRUE

## 2023-11-29 SDOH — ECONOMIC STABILITY: HOUSING INSECURITY
IN THE LAST 12 MONTHS, WAS THERE A TIME WHEN YOU DID NOT HAVE A STEADY PLACE TO SLEEP OR SLEPT IN A SHELTER (INCLUDING NOW)?: NO

## 2023-11-29 SDOH — ECONOMIC STABILITY: INCOME INSECURITY: HOW HARD IS IT FOR YOU TO PAY FOR THE VERY BASICS LIKE FOOD, HOUSING, MEDICAL CARE, AND HEATING?: NOT HARD AT ALL

## 2023-11-29 ASSESSMENT — PATIENT HEALTH QUESTIONNAIRE - PHQ9
SUM OF ALL RESPONSES TO PHQ QUESTIONS 1-9: 2
10. IF YOU CHECKED OFF ANY PROBLEMS, HOW DIFFICULT HAVE THESE PROBLEMS MADE IT FOR YOU TO DO YOUR WORK, TAKE CARE OF THINGS AT HOME, OR GET ALONG WITH OTHER PEOPLE: 0
2. FEELING DOWN, DEPRESSED OR HOPELESS: 1
9. THOUGHTS THAT YOU WOULD BE BETTER OFF DEAD, OR OF HURTING YOURSELF: 0
SUM OF ALL RESPONSES TO PHQ QUESTIONS 1-9: 2
4. FEELING TIRED OR HAVING LITTLE ENERGY: 1
SUM OF ALL RESPONSES TO PHQ QUESTIONS 1-9: 2
6. FEELING BAD ABOUT YOURSELF - OR THAT YOU ARE A FAILURE OR HAVE LET YOURSELF OR YOUR FAMILY DOWN: 0
3. TROUBLE FALLING OR STAYING ASLEEP: 0
1. LITTLE INTEREST OR PLEASURE IN DOING THINGS: 0
SUM OF ALL RESPONSES TO PHQ QUESTIONS 1-9: 2
SUM OF ALL RESPONSES TO PHQ9 QUESTIONS 1 & 2: 1
7. TROUBLE CONCENTRATING ON THINGS, SUCH AS READING THE NEWSPAPER OR WATCHING TELEVISION: 0
5. POOR APPETITE OR OVEREATING: 0
8. MOVING OR SPEAKING SO SLOWLY THAT OTHER PEOPLE COULD HAVE NOTICED. OR THE OPPOSITE, BEING SO FIGETY OR RESTLESS THAT YOU HAVE BEEN MOVING AROUND A LOT MORE THAN USUAL: 0

## 2023-11-29 NOTE — PATIENT INSTRUCTIONS
ASSESSMENT and PLAN    1. Routine general medical examination at a health care facility  2. Mixed hyperlipidemia  3. Prediabetes  4. Sleep disturbance  -     External Referral To Sleep Medicine    Current Status:  Status of hyperlipidemia and prediabetes to be determined pending lab results  Symptoms of anxiety and depression resolved    Health Maintenance Recommendations:  Keep current with COVID-19 immunization guidelines  Hepatitis B immunization series-declines  Influenza immunization declined    Plan:  Sleep medicine referral  Lab studies ordered, further disposition pending lab results if indicated  Avoid dietary starch and sugar and as much as possible follow program of regular aerobic exercise.   Return for annual physical exam and follow-up in 1 year, return sooner with any problems  Please arrive at least 15 minutes prior to your scheduled appointment time

## 2023-11-30 LAB
A/G RATIO: 1.8 RATIO (ref 1.1–2.6)
ALBUMIN SERPL-MCNC: 4.6 G/DL (ref 3.5–5)
ALP BLD-CCNC: 56 U/L (ref 25–115)
ALT SERPL-CCNC: 28 U/L (ref 5–40)
ANION GAP SERPL CALCULATED.3IONS-SCNC: 9 MMOL/L (ref 3–15)
AST SERPL-CCNC: 24 U/L (ref 10–37)
BASOPHILS # BLD: 1 % (ref 0–2)
BASOPHILS ABSOLUTE: 0.1 K/UL (ref 0–0.2)
BILIRUB SERPL-MCNC: 0.3 MG/DL (ref 0.2–1.2)
BILIRUB SERPL-MCNC: NEGATIVE MG/DL
BLOOD: NEGATIVE
BUN BLDV-MCNC: 18 MG/DL (ref 6–22)
CALCIUM SERPL-MCNC: 9.9 MG/DL (ref 8.4–10.5)
CHLORIDE BLD-SCNC: 104 MMOL/L (ref 98–110)
CHOLESTEROL/HDL RATIO: 4.4 (ref 0–5)
CHOLESTEROL: 209 MG/DL (ref 110–200)
CLARITY: CLEAR
CO2: 28 MMOL/L (ref 20–32)
COLOR: YELLOW
CREAT SERPL-MCNC: 0.9 MG/DL (ref 0.5–1.2)
EOSINOPHIL # BLD: 3 % (ref 0–6)
EOSINOPHILS ABSOLUTE: 0.2 K/UL (ref 0–0.5)
ESTIMATED AVERAGE GLUCOSE: 113 MG/DL (ref 91–123)
GLOBULIN: 2.5 G/DL (ref 2–4)
GLOMERULAR FILTRATION RATE: >60 ML/MIN/1.73 SQ.M.
GLUCOSE: 99 MG/DL (ref 70–99)
GLUCOSE: NEGATIVE MG/DL
HBA1C MFR BLD: 5.6 % (ref 4.8–5.6)
HCT VFR BLD CALC: 46.6 % (ref 39.3–51.6)
HDLC SERPL-MCNC: 47 MG/DL
HEMOGLOBIN: 15 G/DL (ref 13.1–17.2)
KETONES, URINE: NEGATIVE MG/DL
LDL CHOLESTEROL CALCULATED: 137 MG/DL (ref 50–99)
LDL/HDL RATIO: 2.9
LEUKOCYTE ESTERASE, URINE: NEGATIVE
LYMPHOCYTES # BLD: 35 % (ref 20–45)
LYMPHOCYTES ABSOLUTE: 1.9 K/UL (ref 1–4.8)
MCH RBC QN AUTO: 30 PG (ref 26–34)
MCHC RBC AUTO-ENTMCNC: 32 G/DL (ref 31–36)
MCV RBC AUTO: 93 FL (ref 80–95)
MONOCYTES ABSOLUTE: 0.4 K/UL (ref 0.1–1)
MONOCYTES: 7 % (ref 3–12)
NEUTROPHILS ABSOLUTE: 3 K/UL (ref 1.8–7.7)
NEUTROPHILS: 54 % (ref 40–75)
NITRITE, URINE: NEGATIVE
NON-HDL CHOLESTEROL: 162 MG/DL
PDW BLD-RTO: 12.6 % (ref 10–15.5)
PH, URINE: 7.5 PH (ref 5–8)
PLATELET # BLD: 228 K/UL (ref 140–440)
PMV BLD AUTO: 12.5 FL (ref 9–13)
POTASSIUM SERPL-SCNC: 4.9 MMOL/L (ref 3.5–5.5)
PROTEIN UA: NEGATIVE MG/DL
RBC: 5.01 M/UL (ref 3.8–5.8)
SODIUM BLD-SCNC: 141 MMOL/L (ref 133–145)
SPECIFIC GRAVITY: 1.02 (ref 1–1.03)
TOTAL PROTEIN: 7.1 G/DL (ref 6.4–8.3)
TRIGL SERPL-MCNC: 121 MG/DL (ref 40–149)
TSH SERPL DL<=0.05 MIU/L-ACNC: 1.32 MCU/ML (ref 0.27–4.2)
UROBILINOGEN: 0.2 MG/DL
VLDLC SERPL CALC-MCNC: 24 MG/DL (ref 8–30)
WBC: 5.5 K/UL (ref 4–11)